# Patient Record
Sex: FEMALE | Race: WHITE | Employment: OTHER | URBAN - METROPOLITAN AREA
[De-identification: names, ages, dates, MRNs, and addresses within clinical notes are randomized per-mention and may not be internally consistent; named-entity substitution may affect disease eponyms.]

---

## 2017-01-02 ENCOUNTER — DOCUMENTATION ONLY (OUTPATIENT)
Dept: CASE MANAGEMENT | Age: 82
End: 2017-01-02

## 2017-01-02 NOTE — PROGRESS NOTES
Emailed information on senior nutrition to patient's daughter  Brief call to daughter to confirm Email address - which is incorrect in the patient's chart. Should be [email or web address]

## 2017-01-05 ENCOUNTER — HOME HEALTH ADMISSION (OUTPATIENT)
Dept: HOME HEALTH SERVICES | Facility: HOME HEALTH | Age: 82
End: 2017-01-05
Payer: MEDICARE

## 2017-01-10 ENCOUNTER — HOME CARE VISIT (OUTPATIENT)
Dept: SCHEDULING | Facility: HOME HEALTH | Age: 82
End: 2017-01-10
Payer: MEDICARE

## 2017-01-10 VITALS — DIASTOLIC BLOOD PRESSURE: 58 MMHG | SYSTOLIC BLOOD PRESSURE: 152 MMHG | RESPIRATION RATE: 16 BRPM | HEART RATE: 66 BPM

## 2017-01-10 PROCEDURE — 400013 HH SOC

## 2017-01-10 PROCEDURE — 3331090001 HH PPS REVENUE CREDIT

## 2017-01-10 PROCEDURE — 3331090002 HH PPS REVENUE DEBIT

## 2017-01-10 PROCEDURE — G0151 HHCP-SERV OF PT,EA 15 MIN: HCPCS

## 2017-01-11 PROCEDURE — 3331090001 HH PPS REVENUE CREDIT

## 2017-01-11 PROCEDURE — 3331090002 HH PPS REVENUE DEBIT

## 2017-01-12 PROCEDURE — 3331090001 HH PPS REVENUE CREDIT

## 2017-01-12 PROCEDURE — 3331090002 HH PPS REVENUE DEBIT

## 2017-01-13 ENCOUNTER — HOME CARE VISIT (OUTPATIENT)
Dept: SCHEDULING | Facility: HOME HEALTH | Age: 82
End: 2017-01-13
Payer: MEDICARE

## 2017-01-13 PROCEDURE — 3331090002 HH PPS REVENUE DEBIT

## 2017-01-13 PROCEDURE — 3331090001 HH PPS REVENUE CREDIT

## 2017-01-13 PROCEDURE — G0151 HHCP-SERV OF PT,EA 15 MIN: HCPCS

## 2017-01-14 PROCEDURE — 3331090001 HH PPS REVENUE CREDIT

## 2017-01-14 PROCEDURE — 3331090002 HH PPS REVENUE DEBIT

## 2017-01-15 VITALS — HEART RATE: 68 BPM | RESPIRATION RATE: 16 BRPM | SYSTOLIC BLOOD PRESSURE: 110 MMHG | DIASTOLIC BLOOD PRESSURE: 62 MMHG

## 2017-01-15 PROCEDURE — 3331090002 HH PPS REVENUE DEBIT

## 2017-01-15 PROCEDURE — 3331090001 HH PPS REVENUE CREDIT

## 2017-01-16 ENCOUNTER — HOME CARE VISIT (OUTPATIENT)
Dept: SCHEDULING | Facility: HOME HEALTH | Age: 82
End: 2017-01-16
Payer: MEDICARE

## 2017-01-16 PROCEDURE — G0151 HHCP-SERV OF PT,EA 15 MIN: HCPCS

## 2017-01-16 PROCEDURE — 3331090001 HH PPS REVENUE CREDIT

## 2017-01-16 PROCEDURE — 3331090002 HH PPS REVENUE DEBIT

## 2017-01-17 PROCEDURE — 3331090002 HH PPS REVENUE DEBIT

## 2017-01-17 PROCEDURE — 3331090001 HH PPS REVENUE CREDIT

## 2017-01-18 VITALS
HEART RATE: 76 BPM | TEMPERATURE: 97.2 F | RESPIRATION RATE: 16 BRPM | DIASTOLIC BLOOD PRESSURE: 72 MMHG | SYSTOLIC BLOOD PRESSURE: 110 MMHG

## 2017-01-18 PROCEDURE — 3331090002 HH PPS REVENUE DEBIT

## 2017-01-18 PROCEDURE — 3331090001 HH PPS REVENUE CREDIT

## 2017-01-19 PROCEDURE — 3331090001 HH PPS REVENUE CREDIT

## 2017-01-19 PROCEDURE — 3331090002 HH PPS REVENUE DEBIT

## 2017-01-20 ENCOUNTER — HOME CARE VISIT (OUTPATIENT)
Dept: HOME HEALTH SERVICES | Facility: HOME HEALTH | Age: 82
End: 2017-01-20
Payer: MEDICARE

## 2017-01-20 PROCEDURE — 3331090002 HH PPS REVENUE DEBIT

## 2017-01-20 PROCEDURE — 3331090001 HH PPS REVENUE CREDIT

## 2017-01-21 PROCEDURE — 3331090001 HH PPS REVENUE CREDIT

## 2017-01-21 PROCEDURE — 3331090002 HH PPS REVENUE DEBIT

## 2017-01-22 PROCEDURE — 3331090001 HH PPS REVENUE CREDIT

## 2017-01-22 PROCEDURE — 3331090002 HH PPS REVENUE DEBIT

## 2017-01-23 PROCEDURE — 3331090002 HH PPS REVENUE DEBIT

## 2017-01-23 PROCEDURE — 3331090001 HH PPS REVENUE CREDIT

## 2017-01-24 ENCOUNTER — HOME CARE VISIT (OUTPATIENT)
Dept: SCHEDULING | Facility: HOME HEALTH | Age: 82
End: 2017-01-24
Payer: MEDICARE

## 2017-01-24 PROCEDURE — 3331090002 HH PPS REVENUE DEBIT

## 2017-01-24 PROCEDURE — G0157 HHC PT ASSISTANT EA 15: HCPCS

## 2017-01-24 PROCEDURE — 3331090001 HH PPS REVENUE CREDIT

## 2017-01-25 VITALS
TEMPERATURE: 97.5 F | HEART RATE: 68 BPM | SYSTOLIC BLOOD PRESSURE: 116 MMHG | RESPIRATION RATE: 16 BRPM | DIASTOLIC BLOOD PRESSURE: 68 MMHG

## 2017-01-25 PROCEDURE — 3331090002 HH PPS REVENUE DEBIT

## 2017-01-25 PROCEDURE — 3331090001 HH PPS REVENUE CREDIT

## 2017-01-26 PROCEDURE — 3331090001 HH PPS REVENUE CREDIT

## 2017-01-26 PROCEDURE — 3331090002 HH PPS REVENUE DEBIT

## 2017-01-27 ENCOUNTER — HOME CARE VISIT (OUTPATIENT)
Dept: SCHEDULING | Facility: HOME HEALTH | Age: 82
End: 2017-01-27
Payer: MEDICARE

## 2017-01-27 PROCEDURE — 3331090002 HH PPS REVENUE DEBIT

## 2017-01-27 PROCEDURE — G0157 HHC PT ASSISTANT EA 15: HCPCS

## 2017-01-27 PROCEDURE — 3331090001 HH PPS REVENUE CREDIT

## 2017-01-28 PROCEDURE — 3331090002 HH PPS REVENUE DEBIT

## 2017-01-28 PROCEDURE — 3331090001 HH PPS REVENUE CREDIT

## 2017-01-29 VITALS
RESPIRATION RATE: 18 BRPM | TEMPERATURE: 97.5 F | SYSTOLIC BLOOD PRESSURE: 116 MMHG | HEART RATE: 88 BPM | DIASTOLIC BLOOD PRESSURE: 70 MMHG

## 2017-01-29 PROCEDURE — 3331090001 HH PPS REVENUE CREDIT

## 2017-01-29 PROCEDURE — 3331090002 HH PPS REVENUE DEBIT

## 2017-01-30 PROCEDURE — 3331090002 HH PPS REVENUE DEBIT

## 2017-01-30 PROCEDURE — 3331090001 HH PPS REVENUE CREDIT

## 2017-01-31 ENCOUNTER — HOME CARE VISIT (OUTPATIENT)
Dept: SCHEDULING | Facility: HOME HEALTH | Age: 82
End: 2017-01-31
Payer: MEDICARE

## 2017-01-31 PROCEDURE — G0157 HHC PT ASSISTANT EA 15: HCPCS

## 2017-01-31 PROCEDURE — 3331090002 HH PPS REVENUE DEBIT

## 2017-01-31 PROCEDURE — 3331090001 HH PPS REVENUE CREDIT

## 2017-02-01 VITALS
HEART RATE: 68 BPM | SYSTOLIC BLOOD PRESSURE: 116 MMHG | DIASTOLIC BLOOD PRESSURE: 72 MMHG | RESPIRATION RATE: 8 BRPM | TEMPERATURE: 97.5 F

## 2017-02-01 PROCEDURE — 3331090001 HH PPS REVENUE CREDIT

## 2017-02-01 PROCEDURE — 3331090002 HH PPS REVENUE DEBIT

## 2017-02-02 PROCEDURE — 3331090001 HH PPS REVENUE CREDIT

## 2017-02-02 PROCEDURE — 3331090002 HH PPS REVENUE DEBIT

## 2017-02-03 ENCOUNTER — HOME CARE VISIT (OUTPATIENT)
Dept: SCHEDULING | Facility: HOME HEALTH | Age: 82
End: 2017-02-03
Payer: MEDICARE

## 2017-02-03 VITALS
TEMPERATURE: 95 F | SYSTOLIC BLOOD PRESSURE: 150 MMHG | DIASTOLIC BLOOD PRESSURE: 68 MMHG | RESPIRATION RATE: 17 BRPM | HEART RATE: 70 BPM

## 2017-02-03 PROCEDURE — 3331090002 HH PPS REVENUE DEBIT

## 2017-02-03 PROCEDURE — 3331090001 HH PPS REVENUE CREDIT

## 2017-02-03 PROCEDURE — 3331090003 HH PPS REVENUE ADJ

## 2017-02-03 PROCEDURE — G0151 HHCP-SERV OF PT,EA 15 MIN: HCPCS

## 2017-02-04 PROCEDURE — 3331090001 HH PPS REVENUE CREDIT

## 2017-02-04 PROCEDURE — 3331090002 HH PPS REVENUE DEBIT

## 2017-02-05 PROCEDURE — 3331090002 HH PPS REVENUE DEBIT

## 2017-02-05 PROCEDURE — 3331090001 HH PPS REVENUE CREDIT

## 2017-02-06 PROCEDURE — 3331090001 HH PPS REVENUE CREDIT

## 2017-02-06 PROCEDURE — 3331090002 HH PPS REVENUE DEBIT

## 2017-02-07 PROCEDURE — 3331090002 HH PPS REVENUE DEBIT

## 2017-02-07 PROCEDURE — 3331090001 HH PPS REVENUE CREDIT

## 2017-02-24 ENCOUNTER — DOCUMENTATION ONLY (OUTPATIENT)
Dept: CASE MANAGEMENT | Age: 82
End: 2017-02-24

## 2017-02-24 NOTE — PROGRESS NOTES
Left message for patient's daughter to call back       [facility] has [address] patient       Need to find out what other needs there might be       Make sure they have our contact information for the future.

## 2017-03-01 ENCOUNTER — PATIENT OUTREACH (OUTPATIENT)
Dept: CASE MANAGEMENT | Age: 82
End: 2017-03-01

## 2017-03-01 NOTE — Clinical Note
Patient has had some considered improvement. If you or her daughter feel the need, we will step back in. Thank you!

## 2017-03-01 NOTE — PROGRESS NOTES
Patient's daughter returned my call. Reports that the [employer] Was very helpful and patient continues to work katlyn the exercises   Also reports that patient is implementing some of the dietary/nutritional suggestions provided at the time of the initial home visit. Plan - asked daughter to keep contact information available and to call should her mother experience some illness, become hospitalized or any concern that she thinks we can be of help. Case resolved at this point.

## 2018-12-24 ENCOUNTER — APPOINTMENT (OUTPATIENT)
Dept: GENERAL RADIOLOGY | Age: 83
DRG: 872 | End: 2018-12-24
Attending: EMERGENCY MEDICINE
Payer: MEDICARE

## 2018-12-24 ENCOUNTER — HOSPITAL ENCOUNTER (INPATIENT)
Age: 83
LOS: 4 days | Discharge: NURSING, REHAB OR LONG-TERM CARE | DRG: 872 | End: 2018-12-28
Attending: EMERGENCY MEDICINE | Admitting: FAMILY MEDICINE
Payer: MEDICARE

## 2018-12-24 ENCOUNTER — APPOINTMENT (OUTPATIENT)
Dept: CT IMAGING | Age: 83
DRG: 872 | End: 2018-12-24
Attending: EMERGENCY MEDICINE
Payer: MEDICARE

## 2018-12-24 DIAGNOSIS — I69.990: ICD-10-CM

## 2018-12-24 DIAGNOSIS — R50.9 FEVER, UNSPECIFIED FEVER CAUSE: ICD-10-CM

## 2018-12-24 DIAGNOSIS — A41.9 SEPSIS, DUE TO UNSPECIFIED ORGANISM: Primary | ICD-10-CM

## 2018-12-24 PROBLEM — R47.81 SLURRED SPEECH: Status: ACTIVE | Noted: 2018-12-24

## 2018-12-24 PROBLEM — I63.9 ACUTE ISCHEMIC STROKE (HCC): Status: ACTIVE | Noted: 2018-12-24

## 2018-12-24 PROBLEM — I10 HTN (HYPERTENSION): Status: ACTIVE | Noted: 2018-12-24

## 2018-12-24 LAB
ALBUMIN SERPL-MCNC: 3.7 G/DL (ref 3.2–4.6)
ALBUMIN/GLOB SERPL: 0.9 {RATIO} (ref 1.2–3.5)
ALP SERPL-CCNC: 64 U/L (ref 50–136)
ALT SERPL-CCNC: 21 U/L (ref 12–65)
ANION GAP SERPL CALC-SCNC: 8 MMOL/L (ref 7–16)
AST SERPL-CCNC: 41 U/L (ref 15–37)
BACTERIA URNS QL MICRO: 0 /HPF
BASOPHILS # BLD: 0.1 K/UL (ref 0–0.2)
BASOPHILS NFR BLD: 0 % (ref 0–2)
BILIRUB SERPL-MCNC: 0.7 MG/DL (ref 0.2–1.1)
BUN SERPL-MCNC: 10 MG/DL (ref 8–23)
CALCIUM SERPL-MCNC: 8.6 MG/DL (ref 8.3–10.4)
CASTS URNS QL MICRO: 0 /LPF
CHLORIDE SERPL-SCNC: 103 MMOL/L (ref 98–107)
CO2 SERPL-SCNC: 25 MMOL/L (ref 21–32)
CREAT SERPL-MCNC: 0.69 MG/DL (ref 0.6–1)
DIFFERENTIAL METHOD BLD: ABNORMAL
EOSINOPHIL # BLD: 0 K/UL (ref 0–0.8)
EOSINOPHIL NFR BLD: 0 % (ref 0.5–7.8)
EPI CELLS #/AREA URNS HPF: 0 /HPF
ERYTHROCYTE [DISTWIDTH] IN BLOOD BY AUTOMATED COUNT: 12.2 % (ref 11.9–14.6)
FLUAV AG NPH QL IA: NEGATIVE
FLUBV AG NPH QL IA: NEGATIVE
GLOBULIN SER CALC-MCNC: 3.9 G/DL (ref 2.3–3.5)
GLUCOSE SERPL-MCNC: 128 MG/DL (ref 65–100)
HCT VFR BLD AUTO: 39 % (ref 35.8–46.3)
HGB BLD-MCNC: 12.4 G/DL (ref 11.7–15.4)
IMM GRANULOCYTES # BLD: 0.1 K/UL (ref 0–0.5)
IMM GRANULOCYTES NFR BLD AUTO: 1 % (ref 0–5)
LACTATE BLD-SCNC: 0.89 MMOL/L (ref 0.5–1.9)
LYMPHOCYTES # BLD: 0.5 K/UL (ref 0.5–4.6)
LYMPHOCYTES NFR BLD: 4 % (ref 13–44)
MAGNESIUM SERPL-MCNC: 1.9 MG/DL (ref 1.8–2.4)
MCH RBC QN AUTO: 30.5 PG (ref 26.1–32.9)
MCHC RBC AUTO-ENTMCNC: 31.8 G/DL (ref 31.4–35)
MCV RBC AUTO: 95.8 FL (ref 79.6–97.8)
MONOCYTES # BLD: 0.8 K/UL (ref 0.1–1.3)
MONOCYTES NFR BLD: 6 % (ref 4–12)
NEUTS SEG # BLD: 11.3 K/UL (ref 1.7–8.2)
NEUTS SEG NFR BLD: 89 % (ref 43–78)
NRBC # BLD: 0 K/UL (ref 0–0.2)
PLATELET # BLD AUTO: 219 K/UL (ref 150–450)
PMV BLD AUTO: 10.4 FL (ref 9.4–12.3)
POTASSIUM SERPL-SCNC: 4.1 MMOL/L (ref 3.5–5.1)
PROT SERPL-MCNC: 7.6 G/DL (ref 6.3–8.2)
RBC # BLD AUTO: 4.07 M/UL (ref 4.05–5.2)
RBC #/AREA URNS HPF: NORMAL /HPF
SODIUM SERPL-SCNC: 136 MMOL/L (ref 136–145)
SPECIMEN SOURCE: NORMAL
WBC # BLD AUTO: 12.7 K/UL (ref 4.3–11.1)
WBC URNS QL MICRO: 0 /HPF

## 2018-12-24 PROCEDURE — 80053 COMPREHEN METABOLIC PANEL: CPT

## 2018-12-24 PROCEDURE — 74011250637 HC RX REV CODE- 250/637: Performed by: EMERGENCY MEDICINE

## 2018-12-24 PROCEDURE — 71045 X-RAY EXAM CHEST 1 VIEW: CPT

## 2018-12-24 PROCEDURE — 87804 INFLUENZA ASSAY W/OPTIC: CPT

## 2018-12-24 PROCEDURE — 87086 URINE CULTURE/COLONY COUNT: CPT

## 2018-12-24 PROCEDURE — 65660000000 HC RM CCU STEPDOWN

## 2018-12-24 PROCEDURE — 74177 CT ABD & PELVIS W/CONTRAST: CPT

## 2018-12-24 PROCEDURE — 85025 COMPLETE CBC W/AUTO DIFF WBC: CPT

## 2018-12-24 PROCEDURE — 81015 MICROSCOPIC EXAM OF URINE: CPT

## 2018-12-24 PROCEDURE — 74011000258 HC RX REV CODE- 258: Performed by: EMERGENCY MEDICINE

## 2018-12-24 PROCEDURE — 70450 CT HEAD/BRAIN W/O DYE: CPT

## 2018-12-24 PROCEDURE — 77030011943

## 2018-12-24 PROCEDURE — 93005 ELECTROCARDIOGRAM TRACING: CPT | Performed by: EMERGENCY MEDICINE

## 2018-12-24 PROCEDURE — 96361 HYDRATE IV INFUSION ADD-ON: CPT | Performed by: EMERGENCY MEDICINE

## 2018-12-24 PROCEDURE — 83605 ASSAY OF LACTIC ACID: CPT

## 2018-12-24 PROCEDURE — 74011250636 HC RX REV CODE- 250/636: Performed by: EMERGENCY MEDICINE

## 2018-12-24 PROCEDURE — 74011636320 HC RX REV CODE- 636/320: Performed by: EMERGENCY MEDICINE

## 2018-12-24 PROCEDURE — 96365 THER/PROPH/DIAG IV INF INIT: CPT | Performed by: EMERGENCY MEDICINE

## 2018-12-24 PROCEDURE — 83735 ASSAY OF MAGNESIUM: CPT

## 2018-12-24 PROCEDURE — 99285 EMERGENCY DEPT VISIT HI MDM: CPT | Performed by: EMERGENCY MEDICINE

## 2018-12-24 PROCEDURE — 87040 BLOOD CULTURE FOR BACTERIA: CPT

## 2018-12-24 PROCEDURE — 51701 INSERT BLADDER CATHETER: CPT | Performed by: EMERGENCY MEDICINE

## 2018-12-24 RX ORDER — ACETAMINOPHEN 500 MG
1000 TABLET ORAL
Status: COMPLETED | OUTPATIENT
Start: 2018-12-24 | End: 2018-12-24

## 2018-12-24 RX ORDER — NALOXONE HYDROCHLORIDE 0.4 MG/ML
0.4 INJECTION, SOLUTION INTRAMUSCULAR; INTRAVENOUS; SUBCUTANEOUS AS NEEDED
Status: DISCONTINUED | OUTPATIENT
Start: 2018-12-24 | End: 2018-12-28 | Stop reason: HOSPADM

## 2018-12-24 RX ORDER — LABETALOL HYDROCHLORIDE 5 MG/ML
5 INJECTION, SOLUTION INTRAVENOUS
Status: DISCONTINUED | OUTPATIENT
Start: 2018-12-24 | End: 2018-12-28 | Stop reason: HOSPADM

## 2018-12-24 RX ORDER — LEVOTHYROXINE SODIUM 50 UG/1
50 TABLET ORAL
Status: DISCONTINUED | OUTPATIENT
Start: 2018-12-25 | End: 2018-12-28 | Stop reason: HOSPADM

## 2018-12-24 RX ORDER — ASCORBIC ACID 500 MG
500 TABLET ORAL 2 TIMES DAILY
Status: DISCONTINUED | OUTPATIENT
Start: 2018-12-25 | End: 2018-12-28 | Stop reason: HOSPADM

## 2018-12-24 RX ORDER — SODIUM CHLORIDE 0.9 % (FLUSH) 0.9 %
10 SYRINGE (ML) INJECTION
Status: COMPLETED | OUTPATIENT
Start: 2018-12-24 | End: 2018-12-24

## 2018-12-24 RX ORDER — SODIUM CHLORIDE 0.9 % (FLUSH) 0.9 %
5-10 SYRINGE (ML) INJECTION AS NEEDED
Status: DISCONTINUED | OUTPATIENT
Start: 2018-12-24 | End: 2018-12-28 | Stop reason: HOSPADM

## 2018-12-24 RX ORDER — ACETAMINOPHEN 325 MG/1
650 TABLET ORAL
Status: DISCONTINUED | OUTPATIENT
Start: 2018-12-24 | End: 2018-12-28 | Stop reason: HOSPADM

## 2018-12-24 RX ORDER — GUAIFENESIN 100 MG/5ML
81 LIQUID (ML) ORAL DAILY
Status: DISCONTINUED | OUTPATIENT
Start: 2018-12-24 | End: 2018-12-28 | Stop reason: HOSPADM

## 2018-12-24 RX ORDER — VANCOMYCIN HYDROCHLORIDE
1250 ONCE
Status: COMPLETED | OUTPATIENT
Start: 2018-12-24 | End: 2018-12-25

## 2018-12-24 RX ORDER — ATORVASTATIN CALCIUM 40 MG/1
40 TABLET, FILM COATED ORAL
Status: DISCONTINUED | OUTPATIENT
Start: 2018-12-24 | End: 2018-12-28 | Stop reason: HOSPADM

## 2018-12-24 RX ORDER — GUAIFENESIN 100 MG/5ML
81 LIQUID (ML) ORAL DAILY
Status: DISCONTINUED | OUTPATIENT
Start: 2018-12-25 | End: 2018-12-24

## 2018-12-24 RX ORDER — SODIUM CHLORIDE 0.9 % (FLUSH) 0.9 %
5-10 SYRINGE (ML) INJECTION EVERY 8 HOURS
Status: DISCONTINUED | OUTPATIENT
Start: 2018-12-24 | End: 2018-12-28 | Stop reason: HOSPADM

## 2018-12-24 RX ORDER — HYDROCODONE BITARTRATE AND ACETAMINOPHEN 5; 325 MG/1; MG/1
1 TABLET ORAL
Status: DISCONTINUED | OUTPATIENT
Start: 2018-12-24 | End: 2018-12-28 | Stop reason: HOSPADM

## 2018-12-24 RX ADMIN — DIATRIZOATE MEGLUMINE AND DIATRIZOATE SODIUM 15 ML: 660; 100 LIQUID ORAL; RECTAL at 23:14

## 2018-12-24 RX ADMIN — SODIUM CHLORIDE 100 ML: 900 INJECTION, SOLUTION INTRAVENOUS at 23:45

## 2018-12-24 RX ADMIN — Medication 10 ML: at 23:45

## 2018-12-24 RX ADMIN — SODIUM CHLORIDE 1000 ML: 900 INJECTION, SOLUTION INTRAVENOUS at 22:00

## 2018-12-24 RX ADMIN — ACETAMINOPHEN 1000 MG: 500 TABLET, FILM COATED ORAL at 22:03

## 2018-12-24 RX ADMIN — CEFTRIAXONE SODIUM 1 G: 1 INJECTION, POWDER, FOR SOLUTION INTRAMUSCULAR; INTRAVENOUS at 22:20

## 2018-12-24 RX ADMIN — IOPAMIDOL 100 ML: 755 INJECTION, SOLUTION INTRAVENOUS at 23:45

## 2018-12-25 ENCOUNTER — APPOINTMENT (OUTPATIENT)
Dept: MRI IMAGING | Age: 83
DRG: 872 | End: 2018-12-25
Attending: FAMILY MEDICINE
Payer: MEDICARE

## 2018-12-25 ENCOUNTER — APPOINTMENT (OUTPATIENT)
Dept: ULTRASOUND IMAGING | Age: 83
DRG: 872 | End: 2018-12-25
Attending: FAMILY MEDICINE
Payer: MEDICARE

## 2018-12-25 LAB
ANION GAP SERPL CALC-SCNC: 7 MMOL/L (ref 7–16)
ATRIAL RATE: 90 BPM
BUN SERPL-MCNC: 11 MG/DL (ref 8–23)
CALCIUM SERPL-MCNC: 7.6 MG/DL (ref 8.3–10.4)
CALCULATED P AXIS, ECG09: 73 DEGREES
CALCULATED R AXIS, ECG10: 55 DEGREES
CALCULATED T AXIS, ECG11: 44 DEGREES
CHLORIDE SERPL-SCNC: 109 MMOL/L (ref 98–107)
CHOLEST SERPL-MCNC: 143 MG/DL
CO2 SERPL-SCNC: 27 MMOL/L (ref 21–32)
CREAT SERPL-MCNC: 0.61 MG/DL (ref 0.6–1)
DIAGNOSIS, 93000: NORMAL
ERYTHROCYTE [DISTWIDTH] IN BLOOD BY AUTOMATED COUNT: 12.3 % (ref 11.9–14.6)
EST. AVERAGE GLUCOSE BLD GHB EST-MCNC: 108 MG/DL
GLUCOSE BLD STRIP.AUTO-MCNC: 128 MG/DL (ref 65–100)
GLUCOSE SERPL-MCNC: 92 MG/DL (ref 65–100)
HBA1C MFR BLD: 5.4 % (ref 4.8–6)
HCT VFR BLD AUTO: 32.7 % (ref 35.8–46.3)
HDLC SERPL-MCNC: 59 MG/DL (ref 40–60)
HDLC SERPL: 2.4 {RATIO}
HGB BLD-MCNC: 10.4 G/DL (ref 11.7–15.4)
LDLC SERPL CALC-MCNC: 73.6 MG/DL
LIPID PROFILE,FLP: NORMAL
MCH RBC QN AUTO: 30.7 PG (ref 26.1–32.9)
MCHC RBC AUTO-ENTMCNC: 31.8 G/DL (ref 31.4–35)
MCV RBC AUTO: 96.5 FL (ref 79.6–97.8)
NRBC # BLD: 0 K/UL (ref 0–0.2)
P-R INTERVAL, ECG05: 196 MS
PLATELET # BLD AUTO: 182 K/UL (ref 150–450)
PMV BLD AUTO: 10.4 FL (ref 9.4–12.3)
POTASSIUM SERPL-SCNC: 3.6 MMOL/L (ref 3.5–5.1)
Q-T INTERVAL, ECG07: 368 MS
QRS DURATION, ECG06: 104 MS
QTC CALCULATION (BEZET), ECG08: 450 MS
RBC # BLD AUTO: 3.39 M/UL (ref 4.05–5.2)
SODIUM SERPL-SCNC: 143 MMOL/L (ref 136–145)
T4 FREE SERPL-MCNC: 1.1 NG/DL (ref 0.78–1.46)
TRIGL SERPL-MCNC: 52 MG/DL (ref 35–150)
TROPONIN I SERPL-MCNC: 0.05 NG/ML (ref 0.02–0.05)
TSH SERPL DL<=0.005 MIU/L-ACNC: 0.61 UIU/ML (ref 0.36–3.74)
VENTRICULAR RATE, ECG03: 90 BPM
VLDLC SERPL CALC-MCNC: 10.4 MG/DL (ref 6–23)
WBC # BLD AUTO: 7.6 K/UL (ref 4.3–11.1)

## 2018-12-25 PROCEDURE — 65660000000 HC RM CCU STEPDOWN

## 2018-12-25 PROCEDURE — 93971 EXTREMITY STUDY: CPT

## 2018-12-25 PROCEDURE — 80061 LIPID PANEL: CPT

## 2018-12-25 PROCEDURE — 80048 BASIC METABOLIC PNL TOTAL CA: CPT

## 2018-12-25 PROCEDURE — 74011250636 HC RX REV CODE- 250/636: Performed by: FAMILY MEDICINE

## 2018-12-25 PROCEDURE — 82962 GLUCOSE BLOOD TEST: CPT

## 2018-12-25 PROCEDURE — 36415 COLL VENOUS BLD VENIPUNCTURE: CPT

## 2018-12-25 PROCEDURE — 74011250637 HC RX REV CODE- 250/637: Performed by: FAMILY MEDICINE

## 2018-12-25 PROCEDURE — 86580 TB INTRADERMAL TEST: CPT | Performed by: FAMILY MEDICINE

## 2018-12-25 PROCEDURE — 84484 ASSAY OF TROPONIN QUANT: CPT

## 2018-12-25 PROCEDURE — 70551 MRI BRAIN STEM W/O DYE: CPT

## 2018-12-25 PROCEDURE — 84443 ASSAY THYROID STIM HORMONE: CPT

## 2018-12-25 PROCEDURE — 74011000302 HC RX REV CODE- 302: Performed by: FAMILY MEDICINE

## 2018-12-25 PROCEDURE — 85027 COMPLETE CBC AUTOMATED: CPT

## 2018-12-25 PROCEDURE — 93880 EXTRACRANIAL BILAT STUDY: CPT

## 2018-12-25 PROCEDURE — 83036 HEMOGLOBIN GLYCOSYLATED A1C: CPT

## 2018-12-25 PROCEDURE — 74011000258 HC RX REV CODE- 258: Performed by: FAMILY MEDICINE

## 2018-12-25 PROCEDURE — 77030020263 HC SOL INJ SOD CL0.9% LFCR 1000ML

## 2018-12-25 PROCEDURE — 84439 ASSAY OF FREE THYROXINE: CPT

## 2018-12-25 RX ORDER — FACIAL-BODY WIPES
10 EACH TOPICAL ONCE
Status: COMPLETED | OUTPATIENT
Start: 2018-12-25 | End: 2018-12-25

## 2018-12-25 RX ORDER — SODIUM CHLORIDE 9 MG/ML
75 INJECTION, SOLUTION INTRAVENOUS CONTINUOUS
Status: DISCONTINUED | OUTPATIENT
Start: 2018-12-25 | End: 2018-12-25

## 2018-12-25 RX ORDER — ENOXAPARIN SODIUM 100 MG/ML
30 INJECTION SUBCUTANEOUS EVERY 24 HOURS
Status: DISCONTINUED | OUTPATIENT
Start: 2018-12-25 | End: 2018-12-28 | Stop reason: HOSPADM

## 2018-12-25 RX ADMIN — LEVOTHYROXINE SODIUM 50 MCG: 50 TABLET ORAL at 05:06

## 2018-12-25 RX ADMIN — VANCOMYCIN HYDROCHLORIDE 1250 MG: 10 INJECTION, POWDER, LYOPHILIZED, FOR SOLUTION INTRAVENOUS at 02:38

## 2018-12-25 RX ADMIN — Medication 10 ML: at 21:42

## 2018-12-25 RX ADMIN — ASPIRIN 81 MG 81 MG: 81 TABLET ORAL at 00:39

## 2018-12-25 RX ADMIN — PIPERACILLIN SODIUM,TAZOBACTAM SODIUM 3.38 G: 3; .375 INJECTION, POWDER, FOR SOLUTION INTRAVENOUS at 00:39

## 2018-12-25 RX ADMIN — Medication 10 ML: at 00:52

## 2018-12-25 RX ADMIN — PIPERACILLIN SODIUM,TAZOBACTAM SODIUM 3.38 G: 3; .375 INJECTION, POWDER, FOR SOLUTION INTRAVENOUS at 08:33

## 2018-12-25 RX ADMIN — PIPERACILLIN SODIUM,TAZOBACTAM SODIUM 3.38 G: 3; .375 INJECTION, POWDER, FOR SOLUTION INTRAVENOUS at 17:18

## 2018-12-25 RX ADMIN — Medication 10 ML: at 05:06

## 2018-12-25 RX ADMIN — Medication 10 ML: at 14:00

## 2018-12-25 RX ADMIN — OXYCODONE HYDROCHLORIDE AND ACETAMINOPHEN 500 MG: 500 TABLET ORAL at 08:33

## 2018-12-25 RX ADMIN — TUBERCULIN PURIFIED PROTEIN DERIVATIVE 5 UNITS: 5 INJECTION, SOLUTION INTRADERMAL at 00:39

## 2018-12-25 RX ADMIN — OXYCODONE HYDROCHLORIDE AND ACETAMINOPHEN 500 MG: 500 TABLET ORAL at 17:18

## 2018-12-25 RX ADMIN — ENOXAPARIN SODIUM 30 MG: 30 INJECTION SUBCUTANEOUS at 21:42

## 2018-12-25 RX ADMIN — BISACODYL 10 MG: 10 SUPPOSITORY RECTAL at 09:46

## 2018-12-25 RX ADMIN — ATORVASTATIN CALCIUM 40 MG: 40 TABLET, FILM COATED ORAL at 21:42

## 2018-12-25 NOTE — ED PROVIDER NOTES
Per nurse's notes: \"Pt arrived from home by self via [facility] with c/o leg weakness. Pt called out to [facility] earlier today for assistance to get out of her chair, refused transport. Called again and EMS asked ehr if she would go to hospital. Pt agreed. Pt states, \"My legs wouldn't work tonight. \" \"      The history is provided by the patient and the EMS personnel. The history is limited by the condition of the patient. Fatigue   This is a new problem. The current episode started 12 to 24 hours ago. The problem has been gradually worsening. There was no focality noted. Pertinent negatives include no focal weakness, no loss of sensation, no loss of balance, no slurred speech, no speech difficulty, no memory loss, no movement disorder, no agitation, no visual change, no auditory change, no mental status change, no unresponsiveness and no disorientation. There has been no fever. Associated symptoms include confusion. Pertinent negatives include no shortness of breath, no chest pain, no vomiting, no altered mental status, no headaches, no choking, no nausea, no bowel incontinence and no bladder incontinence. There were no medications administered prior to arrival. Associated medical issues do not include trauma, mood changes, bleeding disorder, seizures, dementia, CVA or clotting disorder.         Past Medical History:   Diagnosis Date    Acute lymphadenitis     Backache, unspecified     Elevated blood pressure reading without diagnosis of hypertension     Esophageal reflux     Hyperosmolality and/or hypernatremia 2/6/2015    Hypothyroidism 2/6/2015    Impaired fasting glucose 2/6/2015    Mixed hyperlipidemia 2/6/2015    Other constipation     Unspecified venous (peripheral) insufficiency     Viral warts, unspecified 2/6/2015       Past Surgical History:   Procedure Laterality Date    HX HERNIA REPAIR      HX HYSTERECTOMY      TOTAL         Family History:   Problem Relation Age of Onset    Heart Disease Other Arteriosclerotic Cardiovascular Disease    Heart Disease Sister     Diabetes Brother     Heart Disease Brother     Breast Cancer Cousin         2 1st cousins and her jeff's daughter.  Ovarian Cancer Neg Hx     Colon Cancer Neg Hx     Prostate Cancer Neg Hx     Deep Vein Thrombosis Neg Hx     Pulmonary Embolism Neg Hx        Social History     Socioeconomic History    Marital status:      Spouse name: Not on file    Number of children: Not on file    Years of education: Not on file    Highest education level: Not on file   Social Needs    Financial resource strain: Not on file    Food insecurity - worry: Not on file    Food insecurity - inability: Not on file   [email or web address] needs - medical: Not on file   [email or web address] needs - non-medical: Not on file   Occupational History    Not on file   Tobacco Use    Smoking status: Never Smoker    Smokeless tobacco: Never Used   Substance and Sexual Activity    Alcohol use: No     Alcohol/week: 0.0 oz    Drug use: No    Sexual activity: No     Birth control/protection: None   Other Topics Concern    Not on file   Social History Narrative    1. Daughter Emy Mathew. ALLERGIES: Fish oil    Review of Systems   Constitutional: Positive for activity change, appetite change and fatigue. Negative for chills. Respiratory: Negative for choking and shortness of breath. Cardiovascular: Negative for chest pain. Gastrointestinal: Negative for bowel incontinence, nausea and vomiting. Genitourinary: Negative for bladder incontinence. Neurological: Positive for weakness (generalized). Negative for focal weakness, speech difficulty, headaches and loss of balance. Psychiatric/Behavioral: Positive for confusion. Negative for agitation and memory loss. All other systems reviewed and are negative.       Vitals:    12/24/18 2213 12/24/18 2216 12/24/18 2315 12/25/18 0004   BP: 172/76 172/76 122/56 131/69   Pulse: (!) 130 (!) 107 80 77   Resp:       Temp:    98 °F (36.7 °C)   SpO2:  97% 97% 96%   Weight:       Height:                Physical Exam   Constitutional: She is oriented to person, place, and time. She appears well-developed and well-nourished. No distress. Elderly white female, appears fatigued   HENT:   Head: Normocephalic and atraumatic. Right Ear: External ear normal.   Left Ear: External ear normal.   Mouth/Throat: Oropharynx is clear and moist.   Eyes: Conjunctivae and EOM are normal. Pupils are equal, round, and reactive to light. Neck: Normal range of motion. Neck supple. Cardiovascular: Normal rate, regular rhythm, normal heart sounds and intact distal pulses. Pulmonary/Chest: Effort normal and breath sounds normal.   Abdominal: Soft. Bowel sounds are normal. She exhibits no distension and no mass. There is no tenderness. There is no rebound and no guarding. No hernia. Musculoskeletal: Normal range of motion. She exhibits no edema. Neurological: She is alert and oriented to person, place, and time. She displays normal reflexes. No cranial nerve deficit or sensory deficit. She exhibits normal muscle tone. Coordination normal.   Skin: Skin is warm and dry. Capillary refill takes less than 2 seconds. Psychiatric: She has a normal mood and affect. Her speech is delayed. She is slowed. Cognition and memory are normal.   Nursing note and vitals reviewed.        MDM  Number of Diagnoses or Management Options     Amount and/or Complexity of Data Reviewed  Clinical lab tests: ordered and reviewed  Tests in the radiology section of CPT®: ordered and reviewed  Obtain history from someone other than the patient: yes  Review and summarize past medical records: yes  Discuss the patient with other providers: yes  Independent visualization of images, tracings, or specimens: yes    Risk of Complications, Morbidity, and/or Mortality  Presenting problems: high  Diagnostic procedures: high  Management options: high    Patient Progress  Patient progress: stable         Procedures      The patient was observed in the ED. Results Reviewed:      Recent Results (from the past 24 hour(s))   CBC WITH AUTOMATED DIFF    Collection Time: 12/24/18  9:18 PM   Result Value Ref Range    WBC 12.7 (H) 4.3 - 11.1 K/uL    RBC 4.07 4.05 - 5.2 M/uL    HGB 12.4 11.7 - 15.4 g/dL    HCT 39.0 35.8 - 46.3 %    MCV 95.8 79.6 - 97.8 FL    MCH 30.5 26.1 - 32.9 PG    MCHC 31.8 31.4 - 35.0 g/dL    RDW 12.2 11.9 - 14.6 %    PLATELET 619 645 - 609 K/uL    MPV 10.4 9.4 - 12.3 FL    ABSOLUTE NRBC 0.00 0.0 - 0.2 K/uL    DF AUTOMATED      NEUTROPHILS 89 (H) 43 - 78 %    LYMPHOCYTES 4 (L) 13 - 44 %    MONOCYTES 6 4.0 - 12.0 %    EOSINOPHILS 0 (L) 0.5 - 7.8 %    BASOPHILS 0 0.0 - 2.0 %    IMMATURE GRANULOCYTES 1 0.0 - 5.0 %    ABS. NEUTROPHILS 11.3 (H) 1.7 - 8.2 K/UL    ABS. LYMPHOCYTES 0.5 0.5 - 4.6 K/UL    ABS. MONOCYTES 0.8 0.1 - 1.3 K/UL    ABS. EOSINOPHILS 0.0 0.0 - 0.8 K/UL    ABS. BASOPHILS 0.1 0.0 - 0.2 K/UL    ABS. IMM. GRANS. 0.1 0.0 - 0.5 K/UL   METABOLIC PANEL, COMPREHENSIVE    Collection Time: 12/24/18  9:18 PM   Result Value Ref Range    Sodium 136 136 - 145 mmol/L    Potassium 4.1 3.5 - 5.1 mmol/L    Chloride 103 98 - 107 mmol/L    CO2 25 21 - 32 mmol/L    Anion gap 8 7 - 16 mmol/L    Glucose 128 (H) 65 - 100 mg/dL    BUN 10 8 - 23 MG/DL    Creatinine 0.69 0.6 - 1.0 MG/DL    GFR est AA >60 >60 ml/min/1.73m2    GFR est non-AA >60 >60 ml/min/1.73m2    Calcium 8.6 8.3 - 10.4 MG/DL    Bilirubin, total 0.7 0.2 - 1.1 MG/DL    ALT (SGPT) 21 12 - 65 U/L    AST (SGOT) 41 (H) 15 - 37 U/L    Alk.  phosphatase 64 50 - 136 U/L    Protein, total 7.6 6.3 - 8.2 g/dL    Albumin 3.7 3.2 - 4.6 g/dL    Globulin 3.9 (H) 2.3 - 3.5 g/dL    A-G Ratio 0.9 (L) 1.2 - 3.5     POC LACTIC ACID    Collection Time: 12/24/18  9:18 PM   Result Value Ref Range    Lactic Acid (POC) 0.89 0.5 - 1.9 mmol/L   MAGNESIUM    Collection Time: 12/24/18  9:18 PM   Result Value Ref Range    Magnesium 1.9 1.8 - 2.4 mg/dL   EKG, 12 LEAD, INITIAL    Collection Time: 12/24/18  9:38 PM   Result Value Ref Range    Ventricular Rate 90 BPM    Atrial Rate 90 BPM    P-R Interval 196 ms    QRS Duration 104 ms    Q-T Interval 368 ms    QTC Calculation (Bezet) 450 ms    Calculated P Axis 73 degrees    Calculated R Axis 55 degrees    Calculated T Axis 44 degrees    Diagnosis       !! AGE AND GENDER SPECIFIC ECG ANALYSIS !! Sinus rhythm with frequent Premature ventricular complexes  Incomplete right bundle branch block  Borderline ECG  No previous ECGs available     URINE MICROSCOPIC    Collection Time: 12/24/18  9:39 PM   Result Value Ref Range    WBC 0 0 /hpf    RBC 0-3 0 /hpf    Epithelial cells 0 0 /hpf    Bacteria 0 0 /hpf    Casts 0 0 /lpf   INFLUENZA A & B AG (RAPID TEST)    Collection Time: 12/24/18 10:02 PM   Result Value Ref Range    Influenza A Ag NEGATIVE  NEG      Influenza B Ag NEGATIVE  NEG      Source NASOPHARYNGEAL         CT ABD PELV W CONT   Final Result   IMPRESSION:    1. No acute intra-abdominal process. 2. Moderate to marked constipation. 3. Small bilateral kidney cysts. 4. Prior hysterectomy. CT HEAD WO CONT   Final Result   IMPRESSION: No acute intracranial process. XR CHEST PORT   Final Result   IMPRESSION: No acute process.        DUPLEX CAROTID BILATERAL    (Results Pending)   MRI BRAIN WO CONT    (Results Pending)   DUPLEX LOWER EXT VENOUS RIGHT    (Results Pending)

## 2018-12-25 NOTE — PROGRESS NOTES
Pt with impacted stool. Unable to fully expel stool herself, this nurse assisted by manual deimpaction. Pt with large hard stool. Pt now resting well.

## 2018-12-25 NOTE — ED NOTES
TRANSFER - OUT REPORT:    Verbal report given to Yosvany Cruz on Mary Stockton  being transferred to [facility] for routine progression of care       Report consisted of patients Situation, Background, Assessment and   Recommendations(SBAR). Information from the following report(s) SBAR was reviewed with the receiving nurse. Lines:   Peripheral IV Left Antecubital (Active)   Site Assessment Clean, dry, & intact 12/24/2018  9:15 PM   Phlebitis Assessment 0 12/24/2018  9:15 PM   Infiltration Assessment 0 12/24/2018  9:15 PM   Dressing Status Clean, dry, & intact 12/24/2018  9:15 PM   Dressing Type Transparent 12/24/2018  9:15 PM   Hub Color/Line Status Green 12/24/2018  9:15 PM       Peripheral IV 12/24/18 Right Forearm (Active)   Site Assessment Clean, dry, & intact 12/24/2018  9:54 PM   Phlebitis Assessment 0 12/24/2018  9:54 PM   Infiltration Assessment 0 12/24/2018  9:54 PM   Dressing Status Clean, dry, & intact 12/24/2018  9:54 PM   Dressing Type Transparent 12/24/2018  9:54 PM       Peripheral IV 12/24/18 Right Antecubital (Active)   Site Assessment Clean, dry, & intact 12/24/2018 11:11 PM   Phlebitis Assessment 0 12/24/2018 11:11 PM   Infiltration Assessment 0 12/24/2018 11:11 PM   Dressing Status Clean, dry, & intact 12/24/2018 11:11 PM        Opportunity for questions and clarification was provided.       Patient transported with:   [employer]

## 2018-12-25 NOTE — ED NOTES
In and out catheter used to obtain urine specimen. Specimen collected and bladder drained. Rectal temperature taken due to warmth of patient's skin. Patient tolerated procedure well.

## 2018-12-25 NOTE — ED NOTES
Lab notified to add on urine culture and that blood culture set #2 and flu test were sent with temp labels. Pharmacy notified to send rocephin at this time.

## 2018-12-25 NOTE — PROGRESS NOTES
Pharmacokinetic Consult to Pharmacist    Cassy Patino is a 80 y.o. female being treated for sepsis of unknown etiology with Vancomycin. Height: 5' 3\" (160 cm)  Weight: 48.5 kg (107 lb)  Lab Results   Component Value Date/Time    BUN 10 12/24/2018 09:18 PM    Creatinine 0.69 12/24/2018 09:18 PM    WBC 12.7 (H) 12/24/2018 09:18 PM    Lactic Acid (POC) 0.89 12/24/2018 09:18 PM      Estimated Creatinine Clearance: 39.8 mL/min (based on SCr of 0.69 mg/dL). CULTURES:  All Micro Results     Procedure Component Value Units Date/Time    INFLUENZA A & B AG (RAPID TEST) [[id number]] Collected:  12/24/18 2202    Order Status:  Completed Specimen:  Nasopharyngeal from Nasal washing Updated:  12/24/18 2232     Influenza A Ag NEGATIVE         Comment: NEGATIVE FOR THE PRESENCE OF INFLUENZA A ANTIGEN  INFECTION DUE TO INFLUENZA A CANNOT BE RULED OUT. BECAUSE THE ANTIGEN PRESENT IN THE SAMPLE MAY BE BELOW  THE DETECTION LIMIT OF THE TEST. A NEGATIVE TEST IS PRESUMPTIVE AND IT IS RECOMMENDED THAT THESE RESULTS BE CONFIRMED BY VIRAL CULTURE OR AN FDA-CLEARED INFLUENZA A AND B MOLECULAR ASSAY. Influenza B Ag NEGATIVE         Comment: NEGATIVE FOR THE PRESENCE OF INFLUENZA B ANTIGEN  INFECTION DUE TO INFLUENZA B CANNOT BE RULED OUT. BECAUSE THE ANTIGEN PRESENT IN THE SAMPLE MAY BE BELOW  THE DETECTION LIMIT OF THE TEST. A NEGATIVE TEST IS PRESUMPTIVE AND IT IS RECOMMENDED THAT THESE RESULTS BE CONFIRMED BY VIRAL CULTURE OR AN FDA-CLEARED INFLUENZA A AND B MOLECULAR ASSAY. Source NASOPHARYNGEAL       CULTURE, URINE [[id number]] Collected:  12/24/18 2139    Order Status:  Completed Specimen:  Cath Urine Updated:  12/24/18 2223    CULTURE, BLOOD [[id number]] Collected:  12/24/18 2200    Order Status:  Completed Specimen:  Blood Updated:  12/24/18 2221    CULTURE, BLOOD [[id number]] Collected:  12/24/18 2203    Order Status:  Completed Specimen:  Blood Updated:  12/24/18 2213            Day 1 of vancomycin. Goal trough is 15-20. Vancomycin dose initiated at 1250 mg x 1 dose; followed by Vanc 750 mg IV q18h. Will continue to follow patient.       Thank you,  Julio Garland, Pharm D.

## 2018-12-25 NOTE — PROGRESS NOTES
Hospitalist Progress Note     Admit Date:  2018  9:04 PM   Name:  Eleanore Rinne   Age:  80 y.o.  :  1926   MRN:  [id number]   PCP:  Sterling Gifford DO  Treatment Team: Attending Provider: Wilber Martinez DO    Subjective:   Patient is a 80 y.o. female who presented to the ED for a cc leg weakness bilaterally along with slurred speech. Patient has a hx significant for hypothyroidism, HLD, chronic LE weakness of unknown origin for the past two years, and HTN. Patient noticed that she was having a hard time getting up from her chair around 4:30pm today. At that time she also noted to have trouble with speech and some mild confusion. No hx of stroke. Only takes levothyroxine and amlodipine. Vitals - temp 102. Labs - WBC 12.7. UA negative. Chest x ray negative. Flu negative. 18  Daughter and family at bedside  Pt awake,alert,says feel better  Left facial droop      Objective:     Patient Vitals for the past 24 hrs:   Temp Pulse Resp BP SpO2   18 1557 98 °F (36.7 °C) 69 18 138/52 97 %   18 0737 98.5 °F (36.9 °C) 66 18 118/46 95 %   18 0414 98.6 °F (37 °C) 62 12 117/70 90 %   18 0004 98 °F (36.7 °C) 77  131/69 96 %   18 2315  80  122/56 97 %   18 2216  (!) 107  172/76 97 %   18 2213  (!) 130  172/76    18 2210  (!) 111 25 194/83 95 %   18 2208  93  177/75 93 %   18 2142     95 %   18 2131    161/69    18 2130 (!) 102 °F (38.9 °C)       18 99.5 °F (37.5 °C) 96 20 178/77 98 %     Oxygen Therapy  O2 Sat (%): 97 % (18 1557)  Pulse via Oximetry: 80 beats per minute (18 2315)  O2 Device: Room air (18 0346)    Intake/Output Summary (Last 24 hours) at 2018 1816  Last data filed at 2018 1506  Gross per 24 hour   Intake 1360 ml   Output 1800 ml   Net -440 ml         General:    Well nourished.   Alert.    heent- left facial droop, rt eye droop- according to family acute on chronic  CV:   RRR. No murmur, rub, or gallop. Lungs:   Clear to auscultation bilaterally. No wheezing, rhonchi, or rales. Abdomen:   Soft, nontender, nondistended. Cns- no focal neurological deficits- h/o left hip pain- straight leg raising less left when compared to rt. Normal power all extremities. normal reflexes and plantars down going  Extremities: Warm and dry. No cyanosis or edema. Skin:     No rashes or jaundice. Data Review:  I have reviewed all labs, meds, telemetry events, and studies from the last 24 hours. Recent Results (from the past 24 hour(s))   CBC WITH AUTOMATED DIFF    Collection Time: 12/24/18  9:18 PM   Result Value Ref Range    WBC 12.7 (H) 4.3 - 11.1 K/uL    RBC 4.07 4.05 - 5.2 M/uL    HGB 12.4 11.7 - 15.4 g/dL    HCT 39.0 35.8 - 46.3 %    MCV 95.8 79.6 - 97.8 FL    MCH 30.5 26.1 - 32.9 PG    MCHC 31.8 31.4 - 35.0 g/dL    RDW 12.2 11.9 - 14.6 %    PLATELET 328 724 - 468 K/uL    MPV 10.4 9.4 - 12.3 FL    ABSOLUTE NRBC 0.00 0.0 - 0.2 K/uL    DF AUTOMATED      NEUTROPHILS 89 (H) 43 - 78 %    LYMPHOCYTES 4 (L) 13 - 44 %    MONOCYTES 6 4.0 - 12.0 %    EOSINOPHILS 0 (L) 0.5 - 7.8 %    BASOPHILS 0 0.0 - 2.0 %    IMMATURE GRANULOCYTES 1 0.0 - 5.0 %    ABS. NEUTROPHILS 11.3 (H) 1.7 - 8.2 K/UL    ABS. LYMPHOCYTES 0.5 0.5 - 4.6 K/UL    ABS. MONOCYTES 0.8 0.1 - 1.3 K/UL    ABS. EOSINOPHILS 0.0 0.0 - 0.8 K/UL    ABS. BASOPHILS 0.1 0.0 - 0.2 K/UL    ABS. IMM.  GRANS. 0.1 0.0 - 0.5 K/UL   METABOLIC PANEL, COMPREHENSIVE    Collection Time: 12/24/18  9:18 PM   Result Value Ref Range    Sodium 136 136 - 145 mmol/L    Potassium 4.1 3.5 - 5.1 mmol/L    Chloride 103 98 - 107 mmol/L    CO2 25 21 - 32 mmol/L    Anion gap 8 7 - 16 mmol/L    Glucose 128 (H) 65 - 100 mg/dL    BUN 10 8 - 23 MG/DL    Creatinine 0.69 0.6 - 1.0 MG/DL    GFR est AA >60 >60 ml/min/1.73m2    GFR est non-AA >60 >60 ml/min/1.73m2    Calcium 8.6 8.3 - 10.4 MG/DL    Bilirubin, total 0.7 0.2 - 1.1 MG/DL    ALT (SGPT) 21 12 - 65 U/L    AST (SGOT) 41 (H) 15 - 37 U/L    Alk. phosphatase 64 50 - 136 U/L    Protein, total 7.6 6.3 - 8.2 g/dL    Albumin 3.7 3.2 - 4.6 g/dL    Globulin 3.9 (H) 2.3 - 3.5 g/dL    A-G Ratio 0.9 (L) 1.2 - 3.5     POC LACTIC ACID    Collection Time: 12/24/18  9:18 PM   Result Value Ref Range    Lactic Acid (POC) 0.89 0.5 - 1.9 mmol/L   MAGNESIUM    Collection Time: 12/24/18  9:18 PM   Result Value Ref Range    Magnesium 1.9 1.8 - 2.4 mg/dL   EKG, 12 LEAD, INITIAL    Collection Time: 12/24/18  9:38 PM   Result Value Ref Range    Ventricular Rate 90 BPM    Atrial Rate 90 BPM    P-R Interval 196 ms    QRS Duration 104 ms    Q-T Interval 368 ms    QTC Calculation (Bezet) 450 ms    Calculated P Axis 73 degrees    Calculated R Axis 55 degrees    Calculated T Axis 44 degrees    Diagnosis       !! AGE AND GENDER SPECIFIC ECG ANALYSIS !! Sinus rhythm with frequent Premature ventricular complexes  Incomplete right bundle branch block  Borderline ECG  No previous ECGs available  Confirmed by Renaldo Atkinson MD (), CHARO FRANCIS ([id number]) on 12/25/2018 7:19:27 AM     URINE MICROSCOPIC    Collection Time: 12/24/18  9:39 PM   Result Value Ref Range    WBC 0 0 /hpf    RBC 0-3 0 /hpf    Epithelial cells 0 0 /hpf    Bacteria 0 0 /hpf    Casts 0 0 /lpf   CULTURE, URINE    Collection Time: 12/24/18  9:39 PM   Result Value Ref Range    Special Requests: NO SPECIAL REQUESTS      Culture result:        NO GROWTH AFTER SHORT PERIOD OF INCUBATION. FURTHER RESULTS TO FOLLOW AFTER OVERNIGHT INCUBATION.    CULTURE, BLOOD    Collection Time: 12/24/18 10:00 PM   Result Value Ref Range    Special Requests: RIGHT  FOREARM        Culture result: NO GROWTH AFTER 7 HOURS     INFLUENZA A & B AG (RAPID TEST)    Collection Time: 12/24/18 10:02 PM   Result Value Ref Range    Influenza A Ag NEGATIVE  NEG      Influenza B Ag NEGATIVE  NEG      Source NASOPHARYNGEAL     CULTURE, BLOOD    Collection Time: 12/24/18 10:03 PM   Result Value Ref Range Special Requests: NO SPECIAL REQUESTS  LEFT  HAND        Culture result: NO GROWTH AFTER 8 HOURS     CBC W/O DIFF    Collection Time: 12/25/18  5:00 AM   Result Value Ref Range    WBC 7.6 4.3 - 11.1 K/uL    RBC 3.39 (L) 4.05 - 5.2 M/uL    HGB 10.4 (L) 11.7 - 15.4 g/dL    HCT 32.7 (L) 35.8 - 46.3 %    MCV 96.5 79.6 - 97.8 FL    MCH 30.7 26.1 - 32.9 PG    MCHC 31.8 31.4 - 35.0 g/dL    RDW 12.3 11.9 - 14.6 %    PLATELET 214 398 - 625 K/uL    MPV 10.4 9.4 - 12.3 FL    ABSOLUTE NRBC 0.00 0.0 - 0.2 K/uL   LIPID PANEL    Collection Time: 12/25/18  5:00 AM   Result Value Ref Range    LIPID PROFILE          Cholesterol, total 143 <200 MG/DL    Triglyceride 52 35 - 150 MG/DL    HDL Cholesterol 59 40 - 60 MG/DL    LDL, calculated 73.6 <100 MG/DL    VLDL, calculated 10.4 6.0 - 23.0 MG/DL    CHOL/HDL Ratio 2.4     HEMOGLOBIN A1C WITH EAG    Collection Time: 12/25/18  5:00 AM   Result Value Ref Range    Hemoglobin A1c 5.4 4.8 - 6.0 %    Est. average glucose 420 mg/dL   METABOLIC PANEL, BASIC    Collection Time: 12/25/18  5:00 AM   Result Value Ref Range    Sodium 143 136 - 145 mmol/L    Potassium 3.6 3.5 - 5.1 mmol/L    Chloride 109 (H) 98 - 107 mmol/L    CO2 27 21 - 32 mmol/L    Anion gap 7 7 - 16 mmol/L    Glucose 92 65 - 100 mg/dL    BUN 11 8 - 23 MG/DL    Creatinine 0.61 0.6 - 1.0 MG/DL    GFR est AA >60 >60 ml/min/1.73m2    GFR est non-AA >60 >60 ml/min/1.73m2    Calcium 7.6 (L) 8.3 - 10.4 MG/DL   TROPONIN I    Collection Time: 12/25/18  5:00 AM   Result Value Ref Range    Troponin-I, Qt. 0.05 0.02 - 0.05 NG/ML   TSH 3RD GENERATION    Collection Time: 12/25/18  5:00 AM   Result Value Ref Range    TSH 0.611 0.358 - 3.740 uIU/mL   T4, FREE    Collection Time: 12/25/18  5:00 AM   Result Value Ref Range    T4, Free 1.1 0.78 - 1.46 NG/DL   GLUCOSE, POC    Collection Time: 12/25/18  4:09 PM   Result Value Ref Range    Glucose (POC) 128 (H) 65 - 100 mg/dL        All Micro Results     Procedure Component Value Units Date/Time CULTURE, URINE [[id number]] Collected:  12/24/18 2139    Order Status:  Completed Specimen:  Cath Urine Updated:  12/25/18 0947     Special Requests: NO SPECIAL REQUESTS        Culture result:       NO GROWTH AFTER SHORT PERIOD OF INCUBATION. FURTHER RESULTS TO FOLLOW AFTER OVERNIGHT INCUBATION. CULTURE, BLOOD [[id number]] Collected:  12/24/18 2200    Order Status:  Completed Specimen:  Blood Updated:  12/25/18 0623     Special Requests: --        RIGHT  FOREARM       Culture result: NO GROWTH AFTER 7 HOURS       CULTURE, BLOOD [[id number]] Collected:  12/24/18 2203    Order Status:  Completed Specimen:  Blood Updated:  12/25/18 0623     Special Requests: --        NO SPECIAL REQUESTS  LEFT  HAND       Culture result: NO GROWTH AFTER 8 HOURS       INFLUENZA A & B AG (RAPID TEST) [[id number]] Collected:  12/24/18 2202    Order Status:  Completed Specimen:  Nasopharyngeal from Nasal washing Updated:  12/24/18 2232     Influenza A Ag NEGATIVE         Comment: NEGATIVE FOR THE PRESENCE OF INFLUENZA A ANTIGEN  INFECTION DUE TO INFLUENZA A CANNOT BE RULED OUT. BECAUSE THE ANTIGEN PRESENT IN THE SAMPLE MAY BE BELOW  THE DETECTION LIMIT OF THE TEST. A NEGATIVE TEST IS PRESUMPTIVE AND IT IS RECOMMENDED THAT THESE RESULTS BE CONFIRMED BY VIRAL CULTURE OR AN FDA-CLEARED INFLUENZA A AND B MOLECULAR ASSAY. Influenza B Ag NEGATIVE         Comment: NEGATIVE FOR THE PRESENCE OF INFLUENZA B ANTIGEN  INFECTION DUE TO INFLUENZA B CANNOT BE RULED OUT. BECAUSE THE ANTIGEN PRESENT IN THE SAMPLE MAY BE BELOW  THE DETECTION LIMIT OF THE TEST. A NEGATIVE TEST IS PRESUMPTIVE AND IT IS RECOMMENDED THAT THESE RESULTS BE CONFIRMED BY VIRAL CULTURE OR AN FDA-CLEARED INFLUENZA A AND B MOLECULAR ASSAY.           Source NASOPHARYNGEAL             Current Meds:  Current Facility-Administered Medications   Medication Dose Route Frequency    acetaminophen (TYLENOL) tablet 650 mg  650 mg Oral Q4H PRN    HYDROcodone-acetaminophen (NORCO) 5-325 mg per tablet 1 Tab  1 Tab Oral Q4H PRN    naloxone (NARCAN) injection 0.4 mg  0.4 mg IntraVENous PRN    ascorbic acid (vitamin C) (VITAMIN C) tablet 500 mg  500 mg Oral BID    levothyroxine (SYNTHROID) tablet 50 mcg  50 mcg Oral ACB    tuberculin injection 5 Units  5 Units IntraDERMal ONCE    sodium chloride (NS) flush 5-10 mL  5-10 mL IntraVENous Q8H    sodium chloride (NS) flush 5-10 mL  5-10 mL IntraVENous PRN    atorvastatin (LIPITOR) tablet 40 mg  40 mg Oral QHS    labetalol (NORMODYNE;TRANDATE) injection 5 mg  5 mg IntraVENous Q10MIN PRN    sodium chloride (NS) flush 5-10 mL  5-10 mL IntraVENous PRN    piperacillin-tazobactam (ZOSYN) 3.375 g in 0.9% sodium chloride (MBP/ADV) 100 mL  3.375 g IntraVENous Q8H    aspirin chewable tablet 81 mg  81 mg Oral DAILY       Other Studies (last 24 hours):  Mri Brain Wo Cont    Result Date: 12/25/2018  History: Left facial droop, leg weakness, slurred speech. EXAM: MRI brain without contrast TECHNIQUE: Multiplanar multisequence imaging is performed COMPARISON: CT head without contrast dated 12/24/2018 FINDINGS: Diffusion-weighted imaging demonstrates no restricted diffusion to suggest an acute or early subacute infarct. Gradient echo sequence imaging demonstrates no blooming artifact to suggest retained intracranial blood products. There is generalized cerebral atrophy with ex vacuo dilatation of the ventricles. Normal flow voids are present within the major intracranial vessels. There is no mass or mass effect. There is no midline shift. The basilar cisterns are patent. The paranasal sinuses and mastoid air cells are clear. IMPRESSION: 1. No evidence of acute or early subacute infarct. 2. Generalized cerebral atrophy. There is ex vacuo dilation of the ventricles. Ct Head Wo Cont    Result Date: 12/25/2018  EXAM: Noncontrast CT head. DATE: December 24, 2018. INDICATION: Mental status changes. COMPARISON: None.  TECHNIQUE: Noncontrast CT images of the head were obtained. Radiation dose reduction techniques were used for this study. Our CT scanners use one or all of the following:  Automated exposure control, adjustment of the mA or kV according to patient size, iterative reconstruction. FINDINGS: There is mild volume loss with chronic small vessel ischemic changes in the white matter. No acute infarct, hemorrhage or mass is seen. There is no mass effect, midline shift or evidence of hydrocephalus. The skull and skull base are within normal limits. The visualized paranasal sinuses and mastoid air cells are clear. IMPRESSION: No acute intracranial process. Ct Abd Pelv W Cont    Result Date: 12/25/2018  EXAM: CT abdomen and pelvis with IV contrast. DATE: December 24, 2018. INDICATION: Abdominal pain and fever. COMPARISON: None. TECHNIQUE: Axial CT images of the abdomen and pelvis were obtained after oral contrast and the intravenous injection of 100 mm Isovue-370 CT contrast. Radiation dose reduction techniques were used for this study. Our CT scanners use one or all of the following:  Automated exposure control, adjustment of the mA and/or kV according to patient size, iterative reconstruction. FINDINGS: There is moderate to marked constipation. The small bowel loops are within normal limits. No bowel wall thickening or obstruction is identified. The liver, gallbladder, pancreas, spleen, adrenal glands and urinary bladder are unremarkable. There are solitary bilateral kidney cysts, the largest on the left measuring 3.1 cm. No hydronephrosis is identified. The uterus is absent. There is no abdominal aortic aneurysm or dissection. No lymphadenopathy, ascites, free air or acute inflammatory process is identified. The lung bases are clear. IMPRESSION: 1. No acute intra-abdominal process. 2. Moderate to marked constipation. 3. Small bilateral kidney cysts. 4. Prior hysterectomy.      Xr Chest Port    Result Date: 12/24/2018  EXAM: Chest x-ray. DATE: December 24, 2018. INDICATION: Fever and weakness. COMPARISON: None. TECHNIQUE: Single frontal view chest. FINDINGS: The cardiac silhouette is upper normal in size. The lungs are clear. No pneumothorax, pulmonary vascular congestion or pleural effusion is identified. IMPRESSION: No acute process. Duplex Carotid Bilateral    Result Date: 12/25/2018  HISTORY: Stroke with left hemiparesis FINDINGS: Duplex doppler carotid ultrasound exams performed of both the right and left side of the neck. NASCET criteria. Peak systolic velocity right common carotid artery 126 cm/s, right internal carotid of 146 cm/s with a ratio of 1.2 on the right. Right internal carotid artery end-diastolic velocity of 8 cm/s. Peak systolic velocity left common carotid artery 123 cm/s, internal carotid of 126 cm/s with a ratio of 1.0 on the left. Left internal carotid artery end-diastolic velocity of 10 cm/s. Antegrade flow right vertebral artery and antegrade flow left vertebral artery. Grayscale and color-flow imaging reveal bilateral carotid artery atherosclerosis. IMPRESSION: Bilateral carotid artery atherosclerosis. However, no hemodynamically significant internal carotid artery stenosis. Duplex Lower Ext Venous Right    Result Date: 12/25/2018  HISTORY: Chronic right lower extremity swelling and pain. Exam: Right lower extremity ultrasound Technique: Realtime grayscale and color Doppler imaging is performed in the longitudinal and transverse planes. FINDINGS: There is normal flow, augmentation, and compressibility within the deep venous system from the groin to popliteal fossa. Posterior tibial vein and peroneal vein are also normal. No Baker's cyst. IMPRESSIONS: No evidence of deep venous thrombosis within the right lower extremity.        Assessment and Plan:     Hospital Problems as of 12/25/2018 Date Reviewed: 12/12/2018          Codes Class Noted - Resolved POA    * ([facility]) [facility] ICD-10-CM: A41.9  ICD-9-CM: 038.9, 995.91  12/24/2018 - Present Unknown        Slurred speech ICD-10-CM: R47.81  ICD-9-CM: 784.59  12/24/2018 - Present Unknown        Acute ischemic stroke (HCC) ICD-10-CM: I63.9  ICD-9-CM: 434.91  12/24/2018 - Present         HTN (hypertension) ICD-10-CM: I10  ICD-9-CM: 401.9  12/24/2018 - Present Unknown        Mixed hyperlipidemia ICD-10-CM: E78.2  ICD-9-CM: 272.2  2/6/2015 - Present Yes        Hypothyroidism ICD-10-CM: E03.9  ICD-9-CM: 244.9  2/6/2015 - Present Yes              PLAN:    Slurred speech(improving),left facial droop- prob cva- mri pending- neurology consult in am.  ?sepsis- no source- d/c vancomycin- cont zosyn for now  Hypothyroid  htn    DC planning/Dispo:    DVT ppx:  lovenox    Signed:  Manan Driver MD

## 2018-12-25 NOTE — PROGRESS NOTES
Problem: Nutrition Deficit  Goal: *Optimize nutritional status  Nutrition Assessment for:  1. BPA for Admission Nutrition Screen with positive triggers for  poor oral intake, unexpected weight loss, 2-13#.  2. Consult: Cardiac Diet Education    Assessment:   Admission Diagnosis: Sepsis, ? Stroke vs. TIA    Spoke with pt and pt's daughter at bedside. Consumed \"bites\" of breakfast tray: hashbrowns and egg, drank chocolate Ensure. Lunch tray untouched due to having tests done this AM. Pt reports she eats 2 meals at home: grits, an oatmeal and an egg for breakfast with a small cup of coffee; homemade soup made with canned vegetables, canned soup, canned beans, chicken, along with saltine crackers and dessert brought over from friends. Daughter will bring leftovers occasionally. Pt states she drinks a cup of water to take her medication then does not drink any additional water throughout the day. Pt prepares most of her own meals. History: GERD, HLD, high blood pressure    Meds: Vitamin C, Lipitor, Synthroid, Vanc  Labs: Reviewed    Anthropometrics:  Height: 5' 3\" (160 cm), Weight: 48.5 kg (107 lb), Source not stated, Body mass index is 18.95 kg/m². BMI class of underweight for age, BMI 23-30 recommended for geriatric population. Weight History:    Weight Metrics 12/24/2018 12/12/2018 4/27/2018 4/12/2018 12/12/2017 9/6/2017 6/12/2017   Weight 107 lb 107 lb 113 lb 112 lb 109 lb 105 lb 107 lb   BMI 18.95 kg/m2 18.95 kg/m2 20.02 kg/m2 19.84 kg/m2 19.31 kg/m2 18.6 kg/m2 18.95 kg/m2     Pt reports -115lbs most of adult life. Macronutrient needs:  EER: 5798-0557 kcal/day 25-30 kcal/kg Stated body weight  EPR: 48-58 g/day 1-1.2g/kg Stated body weight      Intake/Comparative Standards: Pt consuming bites of Cardiac diet, which is insufficient to meet estimated kcal and protein needs.      Nutrition Diagnosis:  Inadequate oral intake related to food preferences as evidenced by comparative standards, diet recall appears inadequate in calories, protein, and fluid, high in added sodium. Nutrition Intervention:  Meals and Snacks: Continue Ensure Enlive. Advised pt and daughter that this is a good source of protein and calories. Also advised weight should be higher for age and encouraged adding a protein snack during the day (yogurt, cottage cheese, chocolate milk, etc). Encouraged increasing fluid intake due to note of pt having impacted stool upon admission. Noted additional sources of fluid, such as milk, soup, lemonade, etc. Daughter states, \"I can try, but the problem is she is 80 and set in her ways. She doesn't want to do anything different. \" Pt is not appropriate for cardiac diet education for this reason. Defer nutrition education at this time. Discharge Plan: Too soon to determine.      Louise Alexander, [address],   C: [phone]

## 2018-12-25 NOTE — PROGRESS NOTES
TRANSFER - IN REPORT:    Verbal report received from Jarrett Seymour RN on Romain Cottrell  being received from ER for routine progression of care      Report consisted of patients Situation, Background, Assessment and   Recommendations(SBAR). Information from the following report(s) SBAR, ED Summary, [facility] and Recent Results was reviewed with the receiving nurse. Opportunity for questions and clarification was provided. Assessment completed upon patients arrival to unit and care assumed.

## 2018-12-25 NOTE — PROGRESS NOTES
Date 12/24/18 0700 - 12/25/18 0659 12/25/18 0700 - 12/26/18 0659   Shift [id number] 1900-0659 24 Hour Total 9478-6036 9354-8771 24 Hour Total   INTAKE   I.V.  1360 1360        I.V.  310 310        Volume (sodium chloride 0.9 % bolus infusion 1,000 mL)  1000 1000        Volume (cefTRIAXone (ROCEPHIN) 1 g in 0.9% sodium chloride (MBP/ADV) 50 mL MBP)  50 50      Shift Total(mL/kg)  8744(42) 4221(04)      OUTPUT   Urine  900 900        Urine Output (mL) (External Female Catheter 12/25/18)  900 900      Shift Total(mL/kg)  900(18.5) 900(18.5)      NET  460 460      Weight (kg)  48.5 48.5 48.5 48.5 48.5     Hourly rounds done. Pt denies pain, nausea, vomiting. All needs met at this time.

## 2018-12-25 NOTE — ED NOTES
Orthostatic blood pressures completed. Patient requiring 2 nurse assist to stand. Patient fearful with putting feet on ground due to not being able to stand earlier today. Patient resting in stretcher with cardiac monitoring and cycling vitals in place.

## 2018-12-25 NOTE — PROGRESS NOTES
12/25/18 0008   Dual Skin Pressure Injury Assessment   Dual Skin Pressure Injury Assessment WDL   Second Care Provider (Based on [address] [place] [address])  Rocio RN

## 2018-12-26 ENCOUNTER — HOME HEALTH ADMISSION (OUTPATIENT)
Dept: HOME HEALTH SERVICES | Facility: HOME HEALTH | Age: 83
End: 2018-12-26
Payer: MEDICARE

## 2018-12-26 LAB
GLUCOSE BLD STRIP.AUTO-MCNC: 122 MG/DL (ref 65–100)
MM INDURATION POC: 0 MM (ref 0–5)
PPD POC: NEGATIVE NEGATIVE

## 2018-12-26 PROCEDURE — 82962 GLUCOSE BLOOD TEST: CPT

## 2018-12-26 PROCEDURE — 97165 OT EVAL LOW COMPLEX 30 MIN: CPT

## 2018-12-26 PROCEDURE — 97110 THERAPEUTIC EXERCISES: CPT

## 2018-12-26 PROCEDURE — 74011250637 HC RX REV CODE- 250/637: Performed by: FAMILY MEDICINE

## 2018-12-26 PROCEDURE — 97161 PT EVAL LOW COMPLEX 20 MIN: CPT

## 2018-12-26 PROCEDURE — 99222 1ST HOSP IP/OBS MODERATE 55: CPT | Performed by: PSYCHIATRY & NEUROLOGY

## 2018-12-26 PROCEDURE — 74011250636 HC RX REV CODE- 250/636: Performed by: FAMILY MEDICINE

## 2018-12-26 PROCEDURE — 92610 EVALUATE SWALLOWING FUNCTION: CPT

## 2018-12-26 PROCEDURE — 65660000000 HC RM CCU STEPDOWN

## 2018-12-26 PROCEDURE — 74011000258 HC RX REV CODE- 258: Performed by: FAMILY MEDICINE

## 2018-12-26 PROCEDURE — 93306 TTE W/DOPPLER COMPLETE: CPT

## 2018-12-26 PROCEDURE — 97530 THERAPEUTIC ACTIVITIES: CPT

## 2018-12-26 RX ADMIN — ASPIRIN 81 MG 81 MG: 81 TABLET ORAL at 09:55

## 2018-12-26 RX ADMIN — OXYCODONE HYDROCHLORIDE AND ACETAMINOPHEN 500 MG: 500 TABLET ORAL at 17:34

## 2018-12-26 RX ADMIN — Medication 10 ML: at 05:48

## 2018-12-26 RX ADMIN — PIPERACILLIN SODIUM,TAZOBACTAM SODIUM 3.38 G: 3; .375 INJECTION, POWDER, FOR SOLUTION INTRAVENOUS at 09:55

## 2018-12-26 RX ADMIN — Medication 10 ML: at 22:24

## 2018-12-26 RX ADMIN — ENOXAPARIN SODIUM 30 MG: 30 INJECTION SUBCUTANEOUS at 17:35

## 2018-12-26 RX ADMIN — OXYCODONE HYDROCHLORIDE AND ACETAMINOPHEN 500 MG: 500 TABLET ORAL at 09:55

## 2018-12-26 RX ADMIN — ATORVASTATIN CALCIUM 40 MG: 40 TABLET, FILM COATED ORAL at 22:24

## 2018-12-26 RX ADMIN — PIPERACILLIN SODIUM,TAZOBACTAM SODIUM 3.38 G: 3; .375 INJECTION, POWDER, FOR SOLUTION INTRAVENOUS at 00:00

## 2018-12-26 RX ADMIN — LEVOTHYROXINE SODIUM 50 MCG: 50 TABLET ORAL at 05:47

## 2018-12-26 NOTE — PROGRESS NOTES
made initial visit. Pt was alert and verbal appearing comfortable with no pain level expressed or observed. Pt's  was present.  welcomed them to [facility] and shared information regarding spiritual care.  provided spiritual care through presence, pastoral conversation, and assurance of prayer.

## 2018-12-26 NOTE — PROGRESS NOTES
[facility]  Face to Face Encounter    Patients Name: Ayo May    YOB: 1926    Ordering Physician: Dr. Neva Lane    Primary Diagnosis: Sepsis [facility])  Acute ischemic stroke [facility])    Date of Face to Face:   12/26/2018                                  Face to Face Encounter findings are related to primary reason for home care:   yes. 1. I certify that the patient needs intermittent care as follows: skilled nursing care:  skilled observation/assessment, patient education  physical therapy: strengthening, stretching/ROM, transfer training, gait/stair training, balance training and pt/caregiver education  occupational therapy:  ADL safety (ie. cooking, bathing, dressing), ROM and pt/caregiver education    2. I certify that this patient is homebound, that is: 1) patient requires the use of a walker device, special transportation, or assistance of another to leave the home; or 2) patient's condition makes leaving the home medically contraindicated; and 3) patient has a normal inability to leave the home and leaving the home requires considerable and taxing effort. Patient may leave the home for infrequent and short duration for medical reasons, and occasional absences for non-medical reasons. Homebound status is due to the following functional limitations: Patient with strength deficits limiting the performance of all ADL's without caregiver assistance or the use of an assistive device. 3. I certify that this patient is under my care and that I, or a nurse practitioner or  [id number], or clinical nurse specialist, or certified nurse midwife, working with me, had a Face-to-Face Encounter that meets the physician Face-to-Face Encounter requirements.   The following are the clinical findings from the [address] [place] [address] encounter that support the need for skilled services and is a summary of the encounter: see hospital chart      See summary of the patient's illness      Lesli DÍAZ Gautam Abraham, [facility]  12/26/2018      THE FOLLOWING TO BE COMPLETED BY THE COMMUNITY PHYSICIAN:    I concur with the findings described above from the F2F encounter that this patient is homebound and in need of a skilled service.     Certifying Physician: _____________________________________      Printed Certifying Physician Name: _____________________________________    Date: _________________

## 2018-12-26 NOTE — PROGRESS NOTES
CM met with patient to complete intervention. Patient alert and oriented. Patient states she lives alone in a one level home with a ramp to enter into the home. Patient states she independent with her ADL's and do have DME's in the home. States she's not interested in rehab but was agreeable to [facility]) if she really needs it but would prefer no services. Patient interested in returning back home. States her daughter Lela Joya lives near and have family support. CM will continue to follow and make any necessary changes that may occur. Care Management Interventions  PCP Verified by CM: Yes(Laura Chambers)  Mode of Transport at Discharge:  Other (see comment)(Family)  Transition of Care Consult (CM Consult): Discharge Planning, [address]: Yes  Discharge Durable Medical Equipment: No(Patient currently have several DME's in the [facility], rolling walker, rollator, tiolet riser.)  Physical Therapy Consult: Yes  Occupational Therapy Consult: Yes  Speech Therapy Consult: Yes  Current Support Network: Own Home, Lives Alone  Confirm Follow Up Transport: Family  Plan discussed with Pt/Family/Caregiver: Yes  Freedom of Choice Offered: Yes  [address] Provided?: No  Discharge Location  Discharge Placement: Home with home health

## 2018-12-26 NOTE — CONSULTS
Consult    Patient: Narinder Epstein MRN: [id number]  SSN: [id number]    YOB: 1926  Age: 80 y.o. Sex: female      Subjective:      Narinder Epstein is a 80 y.o. female who is being seen for acute onset of deterioration in balance and walking. The patient was noted to have the onset of difficulty getting up from a chair and was also noted to have some degree of slurring in terms of speech and perhaps a facial droop. This is somewhat atypical.  It they've is noted however the patient has long-standing history of gait disturbance going back a number of years and has had repetitive falls with inability to basically sustain independent walking for the last 2 years. Additionally the patient is at a fair amount of bladder difficulty and as result does have a commode chair beside her bed she is unable to make it to the commode otherwise    It was noted at the time that she presented the emergency room that she was afebrile    Other details appended from the chart include:     Patient is a 80 y.o. female who presented to the ED for a cc leg weakness bilaterally along with slurred speech. Patient has a hx significant for hypothyroidism, HLD, chronic LE weakness of unknown origin for the past two years, and HTN. Patient noticed that she was having a hard time getting up from her chair around 4:30pm today. At that time she also noted to have trouble with speech and some mild confusion. No hx of stroke. Only takes levothyroxine and amlodipine.        I reviewed the patient's long-standing gait disorder with her daughter    Anam Rivera been no associated tremor or other features to suggest parkinsonism      Past Medical History:   Diagnosis Date    Acute lymphadenitis     Backache, unspecified     Elevated blood pressure reading without diagnosis of hypertension     Esophageal reflux     Hyperosmolality and/or hypernatremia 2/6/2015    Hypothyroidism 2/6/2015    Impaired fasting glucose 2/6/2015    Mixed hyperlipidemia 2/6/2015    Other constipation     Unspecified venous (peripheral) insufficiency     Viral warts, unspecified 2/6/2015     Past Surgical History:   Procedure Laterality Date    HX HERNIA REPAIR      HX HYSTERECTOMY      TOTAL      Family History   Problem Relation Age of Onset    Heart Disease Other         Arteriosclerotic Cardiovascular Disease    Heart Disease Sister     Diabetes Brother     Heart Disease Brother     Breast Cancer Cousin         2 1st cousins and her jeff's daughter.      Ovarian Cancer Neg Hx     Colon Cancer Neg Hx     Prostate Cancer Neg Hx     Deep Vein Thrombosis Neg Hx     Pulmonary Embolism Neg Hx      Social History     Tobacco Use    Smoking status: Never Smoker    Smokeless tobacco: Never Used   Substance Use Topics    Alcohol use: No     Alcohol/week: 0.0 oz      Current Facility-Administered Medications   Medication Dose Route Frequency Provider Last Rate Last Dose    perflutren lipid microspheres (DEFINITY) in NS bolus IV  1 mL IntraVENous PRN Newell Councilman, DO        enoxaparin (LOVENOX) injection 30 mg  30 mg SubCUTAneous Q24H Lori Hernandez MD   30 mg at 12/25/18 2142    acetaminophen (TYLENOL) tablet 650 mg  650 mg Oral Q4H PRN Newell Councilman, DO        HYDROcodone-acetaminophen (NORCO) 5-325 mg per tablet 1 Tab  1 Tab Oral Q4H PRN Newell Councilman, DO        naloxone [facility]) injection 0.4 mg  0.4 mg IntraVENous PRN Newell Councilman, DO        ascorbic acid (vitamin C) (VITAMIN C) tablet 500 mg  500 mg Oral BID Newell Councilman, DO   500 mg at 12/26/18 0955    levothyroxine (SYNTHROID) tablet 50 mcg  50 mcg Oral ACB Newell Councilman, DO   50 mcg at 12/26/18 0547    sodium chloride (NS) flush 5-10 mL  5-10 mL IntraVENous Q8H Newell Councilman, DO   10 mL at 12/26/18 0548    sodium chloride (NS) flush 5-10 mL  5-10 mL IntraVENous PRN Newell Councilman, DO        atorvastatin (LIPITOR) tablet 40 mg  40 mg Oral QHS Estephania Gauss, DO   40 mg at 12/25/18 2142    labetalol (NORMODYNE;TRANDATE) injection 5 mg  5 mg IntraVENous Q10MIN PRN Estephania Gauss, DO        sodium chloride (NS) flush 5-10 mL  5-10 mL IntraVENous PRN Estephania Gauss, DO        piperacillin-tazobactam (ZOSYN) 3.375 g in 0.9% sodium chloride (MBP/ADV) 100 mL  3.375 g IntraVENous Q8H Penelope Avilez Achilles, DO 25 mL/hr at 12/26/18 0955 3.375 g at 12/26/18 6348    aspirin chewable tablet 81 mg  81 mg Oral DAILY Estephania Gauss, DO   81 mg at 12/26/18 9522        Allergies   Allergen Reactions    Fish Oil Nausea and Vomiting       Review of Systems:  Difficulties with bladder control as noted above considerable difficulty in terms of constipation. From a neurological standpoint no history of seizure type activity there've been no hallucinations delusions confusional episodes denies localized neurologic dysfunction such as diplopia dysarthria or dysphasia dysphagia    Remainder of the 10 point review of systems is unremarkable    Long-standing poor issues in terms of nutrition        Objective:     Vitals:    12/25/18 2304 12/26/18 0423 12/26/18 0724 12/26/18 1055   BP: 122/65 128/70 159/70 151/73   Pulse: 66 69 70 77   Resp: 18 18 18 18   Temp: 99.2 °F (37.3 °C) 98.6 °F (37 °C) 97.7 °F (36.5 °C) 98.2 °F (36.8 °C)   SpO2: 91% 93% 96% 95%   Weight:       Height:            Physical Exam:  80year-old who appears in no acute distress. Speech is difficult in terms of her being edentulous. General examination demonstrates the patient to be poorlynourished. Skin turgor is normal.    Cardiac rate and rhythm is normal.  No murmurs appreciated. Examination of the joints reveals she is very small bone does have a degree of camptocormia consistent with osteopenia no evidence ofactive inflammatory peripheral arthritis in the hands, upper or lower extremitiesKirk.   Clear-cut ongoing degenerative change clear-cut poor muscle bulk and tone throughout    . On formal neurologic examination the patient is alert, cooperative, bright     Interaction is normal.    I did not have the patient undergo a full mental status examination  Head and neck examination demonstrates no carotid bruits. No thyromegaly. No evidence of adenopathy in the neck. Cranial nerves  The optic discs are challenging from the visualization standpoint    3 4 6the extraocular movements are full . There is a mild bilateral ptosis. There is no lid lag and there is no nystagmus. there appears to be a mild extraocular movement disturbance of the supranuclear type  5 7 . The face moves symmetrically and equally. Cheek puff is symmetric. There is normal facial sensation. 8 .  Hearing is intact. Cavanaugh and Kelly do not lateralize. 9.  Palate elevates symmetrically in the midline. 10/11/12 speech is normal.  Tongue is midline in the mouth. Shoulder shrug is symmetric. Motor examination  The musculature appears atrophic throughout. No asymmetry in tone is appreciated. Individual muscle. Muscle strength is tested in both upper and lower extremities and is 5/5 throughout. There is no focal drift appreciated in either the upper or lower extremities. Did not assess the patient's gait formally    Finger to nose and heel knee to shin testing is performed accurately. The deep tendon reflexes. The deep tendon reflexes are assessed at biceps, brachioradialis and triceps in the upper extremities, and at the patella and Achilles in the lower extremities. The reflexes are 2/2 in the upper extremities. The reflexes are trace in the lower extremities    The plantar responses demonstrate a flexor response on the left and on the right side. Assessment:   Likely transient worsening based upon perfusion of long-standing cerebral dysfunction frontal lobe most likely. Agree with considerations to exclude toxic factors.   There is no specific underlying entity here that would be speaking primary underlying neurologic treatment  Hospital Problems  Date Reviewed: 12/12/2018          Codes Class Noted POA    * (Principal) Sepsis ([facility] [address] ICD-10-CM: A41.9  ICD-9-CM: 038.9, 995.91  12/24/2018 Unknown        Slurred speech ICD-10-CM: R47.81  ICD-9-CM: 784.59  12/24/2018 Unknown        HTN (hypertension) ICD-10-CM: I10  ICD-9-CM: 401.9  12/24/2018 Unknown        Mixed hyperlipidemia ICD-10-CM: E78.2  ICD-9-CM: 272.2  2/6/2015 Yes        Hypothyroidism ICD-10-CM: E03.9  ICD-9-CM: 244.9  2/6/2015 Yes              Plan:     Would concur with encouraging the patient in areas of nutrition and hydration and continue working on the importance of conditioning in terms of her lower extremity function in particular    No additional neurologic studies needed    Signed By: Emiliano Kumar MD     December 26, 2018

## 2018-12-26 NOTE — PROGRESS NOTES
Problem: Self Care Deficits Care Plan (Adult)  Goal: *Acute Goals and Plan of Care (Insert Text)  1. Patient will complete total body bathing and dressing with modified independence and adaptive equipment as needed. 2. Patient will complete toileting with modified independence and adaptive equipment as needed. 3. Patient will tolerate 20 minutes of OT treatment with no more than 2 rest breaks to increase activity tolerance for ADLs. 4. Patient will complete functional transfers with modified independence and adaptive equipment as needed. 5. Patient will participate in therapeutic exercise to increase strength in BUEs to 4/5 for increased safety and independence with functional transfers. 6. Patient will complete functional mobility of household distances with stand by assistance and adaptive equipment as needed. Timeframe: 7 visits     OCCUPATIONAL THERAPY: Initial Assessment, Treatment Day: Day of Assessment and 1st and AM 12/26/2018  INPATIENT: Hospital Day: 3  [place] / [place] / Product Type: Medicare /      NAME/AGE/GENDER: Katy Langston is a 80 y.o. female   PRIMARY DIAGNOSIS:  Sepsis ([facility] [address]  Acute ischemic stroke ([facility] [address] Sepsis ([facility] [address] Sepsis ([facility] [address]       ICD-10: Treatment Diagnosis:    · Generalized Muscle Weakness (M62.81)  · Other lack of cordination (R27.8)   Precautions/Allergies:    Fall precautions   Fish oil      ASSESSMENT:     Ms. DAQUAN Ch is a 80 y.o. female admitted with confusion, L facial droop, and trouble getting up, MRI negative for acute infarct. At baseline, pt lives alone with daughter nearby and reports independence with ADLs and modified independence with functional mobility, using rollator indoors and RW outside her home. Pt reports no recent falls. Upon arrival, pt alert and oriented x4, agreeable to OT evaluation and treatment. BUE assessment revealed AROM generally decreased and strength decreased (3+/5) in BUEs.  Pt completed bed mobility with SBA. Treatment initiated to include functional transfers and functional mobility within room. Pt expresses fear of falling. Pt scooted to edge of bed with Arnaldo and practiced sit to stand x2 with Arnaldo on first attempt with cueing for technique and hand placement, and CGA and additional time on second attempt. Pt practiced functional mobility to chair with RW and CGA, requiring additional time. Pt left seated in chair with call bell within reach. Pt's deficits include decreased strength, mobility, balance, and activity tolerance, all of which negatively impact independence with ADLs. Lynn Cid is currently functioning below her independent baseline and would benefit from continued OT to increase safety and independence with ADLs. Will follow. This section established at most recent assessment   PROBLEM LIST (Impairments causing functional limitations):  1. Decreased Strength  2. Decreased ADL/Functional Activities  3. Decreased Transfer Abilities  4. Decreased Ambulation Ability/Technique  5. Decreased Balance  6. Decreased Activity Tolerance  7. Decreased Flexibility/Joint Mobility   INTERVENTIONS PLANNED: (Benefits and precautions of occupational therapy have been discussed with the patient.)  1. Activities of daily living training  2. Adaptive equipment training  3. Balance training  4. Clothing management  5. Donning&doffing training  6. Group therapy  7. Hygiene training  8. Neuromuscular re-eduation  9. Therapeutic activity  10. Therapeutic exercise     TREATMENT PLAN: Frequency/Duration: Follow patient 3x/week to address above goals. Rehabilitation Potential For Stated Goals: Good     RECOMMENDED REHABILITATION/EQUIPMENT: (at time of discharge pending progress): Due to the probability of continued deficits (see above) this patient will likely need continued skilled occupational therapy after discharge.   Equipment:    TBD              OCCUPATIONAL PROFILE AND HISTORY:   History of Present Injury/Illness (Reason for Referral):  See H&P. Past Medical History/Comorbidities:   Ms. Michael Hilliard  has a past medical history of Acute lymphadenitis, Backache, unspecified, Elevated blood pressure reading without diagnosis of hypertension, Esophageal reflux, Hyperosmolality and/or hypernatremia, Hypothyroidism, Impaired fasting glucose, Mixed hyperlipidemia, Other constipation, Unspecified venous (peripheral) insufficiency, and Viral warts, unspecified. Ms. Michael Hilliard  has a past surgical history that includes hx hysterectomy and hx hernia repair. Social History/Living Environment:   Home Environment: Private residence  # Steps to Enter: 0  Wheelchair Ramp: Yes  One/Two Story Residence: One story  Living Alone: Yes  Support Systems: Child(mickey)  Patient Expects to be Discharged to[de-identified] Unknown  Current DME Used/Available at Home: Walker, rollator, Walker, rolling, Commode, bedside  Tub or Shower Type: Tub/Shower combination  Prior Level of Function/Work/Activity:  At baseline, pt lives alone with daughter nearby and reports independence with ADLs and modified independence with functional mobility, using rollator indoors and RW outside her home. Dominant Side:         RIGHT  Personal Factors:          Sex:  female        Age:  80 y.o. Number of Personal Factors/Comorbidities that affect the Plan of Care: Brief history (0):  LOW COMPLEXITY   ASSESSMENT OF OCCUPATIONAL PERFORMANCE[de-identified]   Activities of Daily Living:   Basic ADLs (From Assessment) Complex ADLs (From Assessment)   Feeding: Independent  Oral Facial Hygiene/Grooming: Setup  Bathing: Contact guard assistance  Upper Body Dressing: Modified independent  Lower Body Dressing: Minimum assistance  Toileting: Stand by assistance Instrumental ADL  Meal Preparation: Total assistance  Homemaking: Total assistance   Grooming/Bathing/Dressing Activities of Daily Living     Cognitive Retraining  Safety/Judgement: Awareness of environment; Fall prevention Bed/Mat Mobility  Supine to Sit: Stand-by assistance  Sit to Stand: Contact guard assistance  Bed to Chair: Contact guard assistance  Scooting: Minimum assistance       Most Recent Physical Functioning:   Gross Assessment:  AROM: Generally decreased, functional  Strength: Generally decreased, functional  Coordination: Generally decreased, functional  Sensation: Intact               Posture:     Balance:  Sitting: Intact; Without support  Standing: Impaired  Standing - Static: Constant support; Fair  Standing - Dynamic : Fair Bed Mobility:  Supine to Sit: Stand-by assistance  Scooting: Minimum assistance  Wheelchair Mobility:     Transfers:  Sit to Stand: Contact guard assistance  Stand to Sit: Contact guard assistance  Bed to Chair: Contact guard assistance            Patient Vitals for the past 6 hrs:   BP BP Patient Position SpO2 Pulse   18 0724 159/70 At rest 96 % 70   18 1055 151/73 Sitting 95 % 77       Mental Status  Neurologic State: Alert  Orientation Level: Oriented X4  Cognition: Appropriate decision making, Appropriate for age attention/concentration, Follows commands  Perception: Appears intact  Perseveration: No perseveration noted  Safety/Judgement: Awareness of environment, Fall prevention                          Physical Skills Involved:  1. Range of Motion  2. Balance  3. Strength  4. Activity Tolerance Cognitive Skills Affected (resulting in the inability to perform in a timely and safe manner):  1. None Psychosocial Skills Affected:  1. Habits/Routines  2. Environmental Adaptation  3. Self-Awareness   Number of elements that affect the Plan of Care: 3-5:  MODERATE COMPLEXITY   CLINICAL DECISION MAKIN [facility] [address] [address] AM-PAC 6 Clicks   Daily Activity Inpatient Short Form  How much help from another person does the patient currently need. .. Total A Lot A Little None   1. Putting on and taking off regular lower body clothing? [] 1   [] 2   [x] 3   [] 4   2.   Bathing (including washing, rinsing, drying)? [] 1   [] 2   [x] 3   [] 4   3. Toileting, which includes using toilet, bedpan or urinal?   [] 1   [] 2   [x] 3   [] 4   4. Putting on and taking off regular upper body clothing? [] 1   [] 2   [] 3   [x] 4   5. Taking care of personal grooming such as brushing teeth? [] 1   [] 2   [x] 3   [] 4   6. Eating meals? [] 1   [] 2   [] 3   [x] 4   © 2007, Trustees of [address] [place] [address] [address] [address], under license to [employer]. All rights reserved      Score:  Initial: 20 12/26/2018  Most Recent: X (Date: -- )    Interpretation of Tool:  Represents activities that are increasingly more difficult (i.e. Bed mobility, Transfers, Gait). Score 24 23 22-20 19-15 14-10 9-7 6     Modifier CH CI CJ CK CL CM CN      ? Self Care:     - CURRENT STATUS: CJ - 20%-39% impaired, limited or restricted    - GOAL STATUS: CI - 1%-19% impaired, limited or restricted    - D/C STATUS:  ---------------To be determined---------------  [place] / Product Type: Medicare /      Medical Necessity:     · Patient demonstrates good rehab potential due to higher previous functional level. Reason for Services/Other Comments:  · Patient continues to require skilled intervention due to inability to complete ADLs at prior level of independenc. Use of outcome tool(s) and clinical judgement create a POC that gives a: MODERATE COMPLEXITY         TREATMENT:   (In addition to Assessment/Re-Assessment sessions the following treatments were rendered)     Pre-treatment Symptoms/Complaints:    Pain: Initial:   Pain Intensity 1: 0  Post Session:  same     Therapeutic Activity: (    18 minutes): Therapeutic activities including Bed transfers, Chair transfers and Ambulation on level ground to improve mobility, strength, balance, coordination and activity tolerance. Required minimal   to promote dynamic balance in standing.  Pt scooted to edge of bed with Arnaldo and practiced sit to stand x2 with Arnaldo on first attempt with cueing for technique and hand placement, and CGA and additional time on second attempt. Pt practiced functional mobility to chair with RW and CGA, requiring additional time. Braces/Orthotics/Lines/Etc:   · IV  · Purewick  · O2 Device: Room air  Treatment/Session Assessment:    Response to Treatment:  Tolerated well  · Interdisciplinary Collaboration:   o Occupational Therapist  o Registered Nurse  · After treatment position/precautions:   o Up in chair  o Bed/Chair-wheels locked  o Call light within reach   · Compliance with Program/Exercises: Compliant all of the time. · Recommendations/Intent for next treatment session: \"Next visit will focus on advancements to more challenging activities and reduction in assistance provided\".   Total Treatment Duration:  OT Patient Time In/Time Out  Time In: 0945  Time Out: [address] [facility] OTR/L

## 2018-12-26 NOTE — PROGRESS NOTES
Hourly rounds performed through shift, pt denies needs at this time. Bed in low position and call light/ personal items within reach. Will continue to monitor and report to oncoming nurse.

## 2018-12-26 NOTE — PROGRESS NOTES
Patient had started eating when first arrived at 810, performed other echos then went back and attempted again but OT was performing exam on pt. Will attempt after lunch.

## 2018-12-26 NOTE — PROGRESS NOTES
Hospitalist Progress Note     Admit Date:  2018  9:04 PM   Name:  Day Jackson   Age:  80 y.o.  :  1926   MRN:  [id number]   PCP:  Charlann Meckel, DO  Treatment Team: Attending Provider: Sharon Burrell DO; Consulting Provider: Oralia Walsh MD; Speech Language Pathologist: Shira Grissom Utilization Review: Cele Sparks RN; Care Manager: Shelia Pak LMSW    Subjective:   Patient is a 80 y.o. female who presented to the ED for a cc leg weakness bilaterally along with slurred speech. Patient has a hx significant for hypothyroidism, HLD, chronic LE weakness of unknown origin for the past two years, and HTN. Patient noticed that she was having a hard time getting up from her chair around 4:30pm today. At that time she also noted to have trouble with speech and some mild confusion. No hx of stroke. Only takes levothyroxine and amlodipine. Vitals - temp 102. Labs - WBC 12.7. UA negative. Chest x ray negative. Flu negative. 18  Daughter and family at bedside  Pt awake,alert,says feel better  Left facial droop    18  Says doing fine      Objective:     Patient Vitals for the past 24 hrs:   Temp Pulse Resp BP SpO2   18 1500 97.6 °F (36.4 °C) 65 18 137/61 95 %   18 1055 98.2 °F (36.8 °C) 77 18 151/73 95 %   18 0724 97.7 °F (36.5 °C) 70 18 159/70 96 %   18 0423 98.6 °F (37 °C) 69 18 128/70 93 %   18 2304 99.2 °F (37.3 °C) 66 18 122/65 91 %   18 1946 98.2 °F (36.8 °C) 70 18 145/71 95 %     Oxygen Therapy  O2 Sat (%): 95 % (18 1500)  Pulse via Oximetry: 80 beats per minute (18 2315)  O2 Device: Room air (18 2142)    Intake/Output Summary (Last 24 hours) at 2018 1620  Last data filed at 2018 1500  Gross per 24 hour   Intake 1927 ml   Output 2150 ml   Net -223 ml         General:    Well nourished. Alert.    heent-  rt eye droop- according to family acute on chronic  CV:   RRR.   No murmur, rub, or gallop. Lungs:   Clear to auscultation bilaterally. No wheezing, rhonchi, or rales. Abdomen:   Soft, nontender, nondistended. Cns- no focal neurological deficits- h/o left hip pain- straight leg raising less left when compared to rt. Normal power all extremities. normal reflexes and plantars down going  Extremities: Warm and dry. No cyanosis or edema. Skin:     No rashes or jaundice. Data Review:  I have reviewed all labs, meds, telemetry events, and studies from the last 24 hours. Recent Results (from the past 24 hour(s))   PLEASE READ & DOCUMENT PPD TEST IN 24 HRS    Collection Time: 12/26/18 12:04 AM   Result Value Ref Range    PPD negative Negative    mm Induration 0 mm        All Micro Results     Procedure Component Value Units Date/Time    CULTURE, BLOOD [[id number]] Collected:  12/24/18 2200    Order Status:  Completed Specimen:  Blood Updated:  12/26/18 1224     Special Requests: --        RIGHT  FOREARM       Culture result: NO GROWTH 2 DAYS       CULTURE, BLOOD [[id number]] Collected:  12/24/18 2203    Order Status:  Completed Specimen:  Blood Updated:  12/26/18 1224     Special Requests: --        NO SPECIAL REQUESTS  LEFT  HAND       Culture result: NO GROWTH 2 DAYS       CULTURE, URINE [[id number]] Collected:  12/24/18 2139    Order Status:  Completed Specimen:  Cath Urine Updated:  12/26/18 0751     Special Requests: NO SPECIAL REQUESTS        Culture result: NO GROWTH 1 DAY       INFLUENZA A & B AG (RAPID TEST) [[id number]] Collected:  12/24/18 2202    Order Status:  Completed Specimen:  Nasopharyngeal from Nasal washing Updated:  12/24/18 2232     Influenza A Ag NEGATIVE         Comment: NEGATIVE FOR THE PRESENCE OF INFLUENZA A ANTIGEN  INFECTION DUE TO INFLUENZA A CANNOT BE RULED OUT. BECAUSE THE ANTIGEN PRESENT IN THE SAMPLE MAY BE BELOW  THE DETECTION LIMIT OF THE TEST.   A NEGATIVE TEST IS PRESUMPTIVE AND IT IS RECOMMENDED THAT THESE RESULTS BE CONFIRMED BY VIRAL CULTURE OR AN FDA-CLEARED INFLUENZA A AND B MOLECULAR ASSAY. Influenza B Ag NEGATIVE         Comment: NEGATIVE FOR THE PRESENCE OF INFLUENZA B ANTIGEN  INFECTION DUE TO INFLUENZA B CANNOT BE RULED OUT. BECAUSE THE ANTIGEN PRESENT IN THE SAMPLE MAY BE BELOW  THE DETECTION LIMIT OF THE TEST. A NEGATIVE TEST IS PRESUMPTIVE AND IT IS RECOMMENDED THAT THESE RESULTS BE CONFIRMED BY VIRAL CULTURE OR AN FDA-CLEARED INFLUENZA A AND B MOLECULAR ASSAY. Source NASOPHARYNGEAL             Current Meds:  Current Facility-Administered Medications   Medication Dose Route Frequency    enoxaparin (LOVENOX) injection 30 mg  30 mg SubCUTAneous Q24H    acetaminophen (TYLENOL) tablet 650 mg  650 mg Oral Q4H PRN    HYDROcodone-acetaminophen (NORCO) 5-325 mg per tablet 1 Tab  1 Tab Oral Q4H PRN    naloxone (NARCAN) injection 0.4 mg  0.4 mg IntraVENous PRN    ascorbic acid (vitamin C) (VITAMIN C) tablet 500 mg  500 mg Oral BID    levothyroxine (SYNTHROID) tablet 50 mcg  50 mcg Oral ACB    sodium chloride (NS) flush 5-10 mL  5-10 mL IntraVENous Q8H    sodium chloride (NS) flush 5-10 mL  5-10 mL IntraVENous PRN    atorvastatin (LIPITOR) tablet 40 mg  40 mg Oral QHS    labetalol (NORMODYNE;TRANDATE) injection 5 mg  5 mg IntraVENous Q10MIN PRN    sodium chloride (NS) flush 5-10 mL  5-10 mL IntraVENous PRN    piperacillin-tazobactam (ZOSYN) 3.375 g in 0.9% sodium chloride (MBP/ADV) 100 mL  3.375 g IntraVENous Q8H    aspirin chewable tablet 81 mg  81 mg Oral DAILY       Other Studies (last 24 hours):  No results found.     Assessment and Plan:     Hospital Problems as of 12/26/2018 Date Reviewed: 12/12/2018          Codes Class Noted - Resolved POA    * (Principal) Sepsis ([facility] ICD-10-CM: A41.9  ICD-9-CM: 038.9, 995.91  12/24/2018 - Present Unknown        Slurred speech ICD-10-CM: R47.81  ICD-9-CM: 784.59  12/24/2018 - Present Unknown        Acute ischemic stroke ([facility] ICD-10-CM: I63.9  ICD-9-CM: 434.91  12/24/2018 - Present HTN (hypertension) ICD-10-CM: I10  ICD-9-CM: 401.9  12/24/2018 - Present Unknown        Mixed hyperlipidemia ICD-10-CM: E78.2  ICD-9-CM: 272.2  2/6/2015 - Present Yes        Hypothyroidism ICD-10-CM: E03.9  ICD-9-CM: 244.9  2/6/2015 - Present Yes              PLAN:    Slurred speech(improving),left facial droop- prob cva- mri no stroke- neurology didn't feel like pt had facial droop- 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   Hypothyroid  htn    DC planning/Dispo:  tomorrow  DVT ppx:  garrett    Signed:  Evon Moser MD

## 2018-12-26 NOTE — ED NOTES
Sepsis Response RN has rounded on patient this afternoon. Patient sitting upright in stretcher, daughter at bedside. Patient eating lunch, talkative, pleasant. Sepsis educational hand outs have been provided to patient and daughter. Opportunities for questions and clarification have been provided.

## 2018-12-26 NOTE — PROGRESS NOTES
Problem: Mobility Impaired (Adult and Pediatric)  Goal: *Acute Goals and Plan of Care (Insert Text)  LTG:  (1.)Ms. Brandan Davis will move from supine to sit and sit to supine , scoot up and down and roll side to side in bed with MODIFIED INDEPENDENCE within 7 treatment day(s). (2.)Ms. Brandan Davis will transfer from bed to chair and chair to bed with MODIFIED INDEPENDENCE using the least restrictive device within 7 treatment day(s). (3.)Ms. Deng will ambulate with SUPERVISION for 150 feet with the least restrictive device within 7 treatment day(s). (4.)Ms. Deng will participate in therapeutic activity/exercises x 23 minutes for increased strength within 7 days. ________________________________________________________________________________________________      PHYSICAL THERAPY: Initial Assessment, Treatment Day: Day of Assessment, AM 12/26/2018  INPATIENT: Hospital Day: 3  [place] / Product Type: Medicare /      NAME/AGE/GENDER: Percy Oliveira is a 80 y.o. female   PRIMARY DIAGNOSIS: Sepsis ([facility]  Acute ischemic stroke ([facility] Sepsis ([facility] Sepsis ([facility]       ICD-10: Treatment Diagnosis:    · Generalized Muscle Weakness (M62.81)  · Difficulty in walking, Not elsewhere classified (R26.2)   Precaution/Allergies:  Fish oil      ASSESSMENT:     Ms. Brandan Davis is a 80 y.o. female in the hospital for the above who was sitting in recliner upon arrival.  OT reported that pt lives in a one story house alone that has 0 steps to enter. OT also reported that PTA Ms. Brandan Davis was independent with ADLs and ambulated with RW/rollator depending on distance. Pt admitted to no recent falls in the past year. Ms. Brandan Davis presents to PT with [facility] and decreased strength in B LEs. Pt also presents with intact sensation in B LEs. During evaluation pt performed STS with CGA and fair standing balance.   She ambulated with RW/CGA around room demonstrating VERY short, shuffled steps and forward head posture. Ms. DAQUAN Ch could benefit from skilled PT as she is currently functioning below her baseline. In addition to evaluation pt received treatment consisting of therapeutic exercise. She performed from recliner with visual and verbal cuing. Pt benefited from treatment to address decreased strength. This section established at most recent assessment   PROBLEM LIST (Impairments causing functional limitations):  1. Decreased Strength  2. Decreased Transfer Abilities  3. Decreased Ambulation Ability/Technique  4. Decreased Balance   INTERVENTIONS PLANNED: (Benefits and precautions of physical therapy have been discussed with the patient.)  1. Balance Exercise  2. Bed Mobility  3. Family Education  4. Gait Training  5. Therapeutic Activites  6. Therapeutic Exercise/Strengthening  7. Transfer Training     TREATMENT PLAN: Frequency/Duration: 3 times a week for duration of hospital stay  Rehabilitation Potential For Stated Goals: Good     RECOMMENDED REHABILITATION/EQUIPMENT: (at time of discharge pending progress): Due to the probability of continued deficits (see above) this patient will likely need continued skilled physical therapy after discharge. Equipment:    None at this time              HISTORY:   History of Present Injury/Illness (Reason for Referral):  See H&P  Past Medical History/Comorbidities:   Ms. DAQUAN Ch  has a past medical history of Acute lymphadenitis, Backache, unspecified, Elevated blood pressure reading without diagnosis of hypertension, Esophageal reflux, Hyperosmolality and/or hypernatremia, Hypothyroidism, Impaired fasting glucose, Mixed hyperlipidemia, Other constipation, Unspecified venous (peripheral) insufficiency, and Viral warts, unspecified. Ms. DAQUAN Ch  has a past surgical history that includes hx hysterectomy and hx hernia repair.   Social History/Living Environment:   Home Environment: Private residence  # Steps to Enter: 0  Wheelchair Ramp: Yes  One/Two Story Residence: One story  Living Alone: Yes  Support Systems: Child(mickey)  Patient Expects to be Discharged to[de-identified] Unknown  Current DME Used/Available at Home: Theora Au, rollator, Walker, rolling  Tub or Shower Type: Tub/Shower combination  Prior Level of Function/Work/Activity:  Pt lives alone in one story house with ramp to enter. PTA was independent with ADLs and ambulatory with AD. Number of Personal Factors/Comorbidities that affect the Plan of Care:  Lives alone 0: LOW COMPLEXITY   EXAMINATION:   Most Recent Physical Functioning:   Gross Assessment:  AROM: Within functional limits  Strength: Generally decreased, functional(B hip flexion 4-/5)  Sensation: Intact               Posture:  Posture (WDL): Exceptions to WDL  Posture Assessment: Forward head  Balance:  Sitting: Intact; With support  Standing: Impaired  Standing - Static: Fair  Standing - Dynamic : Fair Bed Mobility:     Wheelchair Mobility:     Transfers:  Sit to Stand: Contact guard assistance  Stand to Sit: Contact guard assistance  Gait:            Body Structures Involved:  1. Metabolic  2. Endocrine  3. Muscles Body Functions Affected:  1. Neuromusculoskeletal  2. Movement Related  3. Metobolic/Endocrine Activities and Participation Affected:  1. General Tasks and Demands  2. Mobility  3. Domestic Life  4. Community, Social and [place]   Number of elements that affect the Plan of Care: 4+: HIGH COMPLEXITY   CLINICAL PRESENTATION:   Presentation: Stable and uncomplicated: LOW COMPLEXITY   CLINICAL DECISION MAKIN [facility] Mobility Inpatient Short Form  How much difficulty does the patient currently have. .. Unable A Lot A Little None   1. Turning over in bed (including adjusting bedclothes, sheets and blankets)? [] 1   [] 2   [] 3   [x] 4   2. Sitting down on and standing up from a chair with arms ( e.g., wheelchair, bedside commode, etc.)   [] 1   [] 2   [x] 3   [] 4   3.   Moving from lying on back to sitting on the side of the bed? [] 1   [] 2   [] 3   [x] 4   How much help from another person does the patient currently need. .. Total A Lot A Little None   4. Moving to and from a bed to a chair (including a wheelchair)? [] 1   [] 2   [x] 3   [] 4   5. Need to walk in hospital room? [] 1   [] 2   [x] 3   [] 4   6. Climbing 3-5 steps with a railing? [] 1   [] 2   [x] 3   [] 4   © 2007, Trustees of [facility] [place] [employer], under license to [email or web address]. All rights reserved      Score:  Initial: 20 Most Recent: X (Date: -- )    Interpretation of Tool:  Represents activities that are increasingly more difficult (i.e. Bed mobility, Transfers, Gait). Score 24 23 22-20 19-15 14-10 9-7 6     Modifier CH CI CJ CK CL CM CN      ? Mobility - Walking and Moving Around:     - CURRENT STATUS: CJ - 20%-39% impaired, limited or restricted    - GOAL STATUS: CI - 1%-19% impaired, limited or restricted    - D/C STATUS:  ---------------To be determined---------------  [place] / [place] / Product Type: Medicare /      Medical Necessity:     · Patient demonstrates good rehab potential due to higher previous functional level. Reason for Services/Other Comments:  · Patient continues to require skilled intervention due to decreased balance and ambulation. Use of outcome tool(s) and clinical judgement create a POC that gives a: Clear prediction of patient's progress: LOW COMPLEXITY            TREATMENT:   (In addition to Assessment/Re-Assessment sessions the following treatments were rendered)   Pre-treatment Symptoms/Complaints:  None  Pain: Initial:   Pain Intensity 1: 0  Post Session:  0     Therapeutic Exercise: (8 Minutes ):  Exercises per grid below to improve strength. Required minimal visual and verbal cues to promote proper body alignment and promote proper body mechanics. Progressed repetitions and complexity of movement as indicated. Date:  12/26/18 Date:   Date:     ACTIVITY/EXERCISE AM PM AM PM AM PM   Seated LAQ 1 x 15 B  1 x 20 B        Seated marching 1 x 15 B  1 x 20 B        Seated AP 1 x 20 B        Seated hip abd/add 1 x 15 B  1 x 20 B                                   B = bilateral; AA = active assistive; A = active; P = passive          Braces/Orthotics/Lines/Etc:   · IV  · O2 Device: Room air  Treatment/Session Assessment:    · Response to Treatment:  Tolerated well but short, shuffled steps. · Interdisciplinary Collaboration:   o Physical Therapist  o Occupational Therapist  o Registered Nurse  o   · After treatment position/precautions:   o Up in chair  o Bed/Chair-wheels locked  o Call light within reach  o RN notified   · Compliance with Program/Exercises: Will assess as treatment progresses  · Recommendations/Intent for next treatment session: \"Next visit will focus on advancements to more challenging activities and reduction in assistance provided\".   Total Treatment Duration:  PT Patient Time In/Time Out  Time In: 1022  Time Out: 2500 S. Jyoti Estrada, PT, DPT

## 2018-12-26 NOTE — CDMP QUERY
Please clarify if this patient is being treated/managed for:    1. Underweight (BMI 19.9 or less, adult) in the setting of sepsis as evidenced by BMI 19 and weight of 107lbs being treated with RD consult and continuation of Ensure Enlive and encouraged to increased fluid intake PO and adding a protein snack. 2. Other Explanation of clinical findings  3. Unable to Determine (no explanation of clinical findings)    The medical record reflects the following:    Risk Factors: Age, Sepsis, Stroke    Clinical Indicators: BMI 19 and weight of 107lbs     Treatment: RD consult and continuation of Ensure Enlive and encouraged to increased fluid intake PO and adding a protein snack    Please clarify and document your clinical opinion in the progress notes and discharge summary including the definitive and/or presumptive diagnosis, (suspected or probable), related to the above clinical findings. Please include clinical findings supporting your diagnosis.     Thanks,  ROBERT RodriguezN, RN, CDS  Compliant Documentation Management Program  [phone]

## 2018-12-26 NOTE — PROGRESS NOTES
STG: Patient will participate in speech/language evaluation   Speech language pathology: bedside swallow note: Initial Assessment    NAME/AGE/GENDER: Sadie Trujillo is a 80 y.o. female  DATE: 12/26/2018  PRIMARY DIAGNOSIS: Sepsis ([facility] [address]  [facility]      ICD-10: Treatment Diagnosis: oropharyngeal dysphagia R13.12  INTERDISCIPLINARY COLLABORATION: Registered Nurse  PRECAUTIONS/ALLERGIES: Fish oil ASSESSMENT: Ms. DAQUAN Ch presents with functional swallow. Patient presented with thin liquid via cup and straw, puree, mixed, and solid consistencies. Mildly prolonged mastication, however effective. Timely swallow initiation, and single swallows upon palpation. Adequate oral clearing with liquid wash. No overt signs or symptoms of airway compromise observed with liquid or solid textures. Recommend continue regular diet/thin liquids. No dysphagia treatment indicated at this time. Patient with slowed, delayed speech. ? impaired comprehension given need for repetition for clarity throughout session. Impaired rate/precision in diadochokinetic task. Reports currently living alone. Will follow up with speech/language evaluation to determine abilities. Patient will benefit from skilled intervention to address the below impairments. ?????? ? ? This section established at most recent assessment??????????  PROBLEM LIST (Impairments causing functional limitations):  1. Oropharyngeal dysphagia (no symptoms identified)  REHABILITATION POTENTIAL FOR STATED GOALS: Good  PLAN OF CARE:   Patient will benefit from skilled intervention to address the following impairments.   RECOMMENDATIONS AND PLANNED INTERVENTIONS (Benefits and precautions of therapy have been discussed with the patient.):  · continue prescribed diet  MEDICATIONS:  · With liquid  · Whole in puree  ASPIRATION PRECAUTIONS:  · Slow rate of intake  · Small bites/sips  · Upright at 90 degrees during meal  COMPENSATORY STRATEGIES/MODIFICATIONS INCLUDING:  · None  OTHER RECOMMENDATIONS (including follow up treatment recommendations):   · speech/language evaluation  RECOMMENDED DIET MODIFICATIONS DISCUSSED WITH:  · Patient  · Nursing  FREQUENCY/DURATION: Speech therapy to follow up for speech/language evaluation RECOMMENDED REHABILITATION/EQUIPMENT: (at time of discharge pending progress): Due to the probability of continued deficits (see above) this patient will not likely need continued skilled speech therapy after discharge. SUBJECTIVE:   Oriented x4. Delayed response time. History of Present Injury/Illness: Ms. Jeffrey Santana  has a past medical history of Acute lymphadenitis, Backache, unspecified, Elevated blood pressure reading without diagnosis of hypertension, Esophageal reflux, Hyperosmolality and/or hypernatremia, Hypothyroidism, Impaired fasting glucose, Mixed hyperlipidemia, Other constipation, Unspecified venous (peripheral) insufficiency, and Viral warts, unspecified. .  She also  has a past surgical history that includes hx hysterectomy and hx hernia repair. Present Symptoms:    Pain Scale 1: Numeric (0 - 10)  Pain Intensity 1: 0  Current Medications:   No current facility-administered medications on file prior to encounter. Current Outpatient Medications on File Prior to Encounter   Medication Sig Dispense Refill    levothyroxine (SYNTHROID) 50 mcg tablet Take 1 Tab by mouth Daily (before breakfast). 90 Tab 4    amLODIPine (NORVASC) 5 mg tablet Take 1 Tab by mouth daily. (Patient taking differently: Take 5 mg by mouth nightly.) 90 Tab 5    ascorbic acid (VITAMIN C) 500 mg tablet Take 500 mg by mouth two (2) times a day.  calcium carbonate-simethicone 1,000-60 mg chew Take 1,200 mg by mouth daily.  multivitamin (ONE A DAY) tablet Take 1 Tab by mouth daily.  CHOLECALCIFEROL, VITAMIN D3, (VITAMIN D3 PO) Take  by mouth.        Current Dietary Status:     Regular/thin  Social History/Home Situation:    Home Environment: Private residence  One/Two Story Residence: One story  Living Alone: Yes  Support Systems: Child(mickey)  Patient Expects to be Discharged to[de-identified] Private residence  Current DME Used/Available at Home: Walker  OBJECTIVE:   Respiratory Status:        CXR Results:No acute process. MRI/CT Results:1. No evidence of acute or early subacute infarct. 2. Generalized cerebral atrophy. There is ex vacuo dilation of the ventricles. Oral Motor Structure/Speech:  Oral-Motor Structure/Motor Speech  Labial: Decreased rate  Dentition: Upper & lower dentures  Oral Hygiene: adequate  Lingual: Decreased rate    Cognitive and Communication Status:     Orientation Level: Oriented X4  Cognition: Follows commands  Perception: Appears intact          BEDSIDE SWALLOW EVALUATION  Oral Assessment:  Oral Assessment  Labial: Decreased rate  Dentition: Upper & lower dentures  Oral Hygiene: adequate  Lingual: Decreased rate  P.O. Trials:  Patient Position: upright in bed    The patient was given  the following:   Consistency Presented: Mixed consistency; Solid; Thin liquid;Puree  How Presented: Self-fed/presented;Spoon;Straw;Successive swallows;Cup/gulp;Cup/sip    ORAL PHASE:  Bolus Acceptance: No impairment  Bolus Formation/Control: No impairment  Propulsion: No impairment     Oral Residue: Less than 10% of bolus(independently cleared)    PHARYNGEAL PHASE:  Initiation of Swallow: No impairment  Laryngeal Elevation: Functional  Aspiration Signs/Symptoms: None  Vocal Quality: No impairment           Pharyngeal Phase Characteristics: No impairment, issues, or problems     OTHER OBSERVATIONS:  Rate/bite size: WNL   Endurance:   WNL   Comments:  Mildly prolonged mastication, yet effective     Tool Used: Dysphagia Outcome and Severity Scale (MARAL)    Score Comments   Normal Diet  [] 7 With no strategies or extra time needed   Functional Swallow  [x] 6 May have mild oral or pharyngeal delay       Mild Dysphagia    [] 5 Which may require one diet consistency restricted (those who demonstrate penetration which is entirely cleared on MBS would be included)   Mild-Moderate Dysphagia  [] 4 With 1-2 diet consistencies restricted       Moderate Dysphagia  [] 3 With 2 or more diet consistencies restricted       Moderately Severe Dysphagia  [] 2 With partial PO strategies (trials with ST only)       Severe Dysphagia  [] 1 With inability to tolerate any PO safely          Score:  Initial: 6 Most Recent: X (Date: -- )   Interpretation of Tool: The Dysphagia Outcome and Severity Scale (MARAL) is a simple, easy-to-use, 7-point scale developed to systematically rate the functional severity of dysphagia based on objective assessment and make recommendations for diet level, independence level, and type of nutrition. Score 7 6 5 4 3 2 1   Modifier CH CI CJ CK CL CM CN   ?  Swallowing:     - CURRENT STATUS: CI - 1%-19% impaired, limited or restricted    - GOAL STATUS:  CI - 1%-19% impaired, limited or restricted    - D/C STATUS:  CI - 1%-19% impaired, limited or restricted  [place] / [place] / Product Type: Medicare /     TREATMENT:    (In addition to Assessment/Re-Assessment sessions the following treatments were rendered)  Assessment/Reassessment only, no treatment provided today  MODALITIES:                                                                    ORAL MOTOR  EXERCISES:                                                                                                                                                                      LARYNGEAL / PHARYNGEAL EXERCISES:                                                                                                                                     __________________________________________________________________________________________________  Safety:   After treatment position/precautions:  · Call light within reach  · Upright in Bed  Treatment Assessment:  Evaluation only.  Progression/Medical Necessity:   · No dysphagia treatment indicated. Will follow up for speech/langauge evaluation  Compliance with Program/Exercises: Will assess as treatment progresses  Reason for Continuation of Services/Other Comments:  · Patient continues to require skilled intervention due to medical complications. Recommendations/Intent for next treatment session: \"Treatment next visit will focus on speech/language evaluation\".     Total Treatment Duration:  Time In: 3987  Time Out: [address], [facility], [facility] NATALI SOARES, CF-SLP

## 2018-12-27 LAB
BACTERIA SPEC CULT: NORMAL
GLUCOSE BLD STRIP.AUTO-MCNC: 101 MG/DL (ref 65–100)
GLUCOSE BLD STRIP.AUTO-MCNC: 123 MG/DL (ref 65–100)
GLUCOSE BLD STRIP.AUTO-MCNC: 138 MG/DL (ref 65–100)
GLUCOSE BLD STRIP.AUTO-MCNC: 160 MG/DL (ref 65–100)
MM INDURATION POC: 0 MM (ref 0–5)
PPD POC: NEGATIVE NEGATIVE
SERVICE CMNT-IMP: NORMAL

## 2018-12-27 PROCEDURE — 74011250637 HC RX REV CODE- 250/637: Performed by: FAMILY MEDICINE

## 2018-12-27 PROCEDURE — 97530 THERAPEUTIC ACTIVITIES: CPT

## 2018-12-27 PROCEDURE — 65660000000 HC RM CCU STEPDOWN

## 2018-12-27 PROCEDURE — 82962 GLUCOSE BLOOD TEST: CPT

## 2018-12-27 PROCEDURE — 74011250636 HC RX REV CODE- 250/636: Performed by: FAMILY MEDICINE

## 2018-12-27 RX ADMIN — ENOXAPARIN SODIUM 30 MG: 30 INJECTION SUBCUTANEOUS at 21:45

## 2018-12-27 RX ADMIN — Medication 10 ML: at 05:26

## 2018-12-27 RX ADMIN — OXYCODONE HYDROCHLORIDE AND ACETAMINOPHEN 500 MG: 500 TABLET ORAL at 17:24

## 2018-12-27 RX ADMIN — OXYCODONE HYDROCHLORIDE AND ACETAMINOPHEN 500 MG: 500 TABLET ORAL at 09:09

## 2018-12-27 RX ADMIN — Medication 10 ML: at 13:33

## 2018-12-27 RX ADMIN — LEVOTHYROXINE SODIUM 50 MCG: 50 TABLET ORAL at 05:26

## 2018-12-27 RX ADMIN — ATORVASTATIN CALCIUM 40 MG: 40 TABLET, FILM COATED ORAL at 21:46

## 2018-12-27 RX ADMIN — ASPIRIN 81 MG 81 MG: 81 TABLET ORAL at 09:09

## 2018-12-27 RX ADMIN — Medication 10 ML: at 21:46

## 2018-12-27 NOTE — PROGRESS NOTES
Interdisciplinary Rounds completed 12/27/18. Nursing, Case Management, Physician and PT present. Plan of care reviewed and updated.     Probable d/c to rehab 12/29/18

## 2018-12-27 NOTE — PROGRESS NOTES
Hospitalist Progress Note     Admit Date:  2018  9:04 PM   Name:  Nestor Chaudhry   Age:  80 y.o.  :  1926   MRN:  [id number]   PCP:  Carole Bower DO  Treatment Team: Attending Provider: Pamela Robles DO; Consulting Provider: Hima Loo MD; Utilization Review: Donald Jung RN; Care Manager: Alita Canavan[address]; Charge Nurse: Elizabeth Hurd Speech Language Pathologist: Heather Downey    Subjective:   Patient is a 80 y.o. female who presented to the ED for a cc leg weakness bilaterally along with slurred speech. Patient has a hx significant for hypothyroidism, HLD, chronic LE weakness of unknown origin for the past two years, and HTN. Patient noticed that she was having a hard time getting up from her chair around 4:30pm today. At that time she also noted to have trouble with speech and some mild confusion. No hx of stroke. Only takes levothyroxine and amlodipine. Vitals - temp 102. Labs - WBC 12.7. UA negative. Chest x ray negative. Flu negative. 18  Daughter and family at bedside  Pt awake,alert,says feel better  Left facial droop    18  Says doing fine    18  Daughter at bedside  No complaints      Objective:     Patient Vitals for the past 24 hrs:   Temp Pulse Resp BP SpO2   18 1148 98 °F (36.7 °C) 72 18 140/50 97 %   18 0748 98.3 °F (36.8 °C) 78 18 130/66 96 %   18 0430 98.2 °F (36.8 °C) 79 18 136/62 95 %   18 2233 97.9 °F (36.6 °C) 75 18 132/65 96 %   18 1851 98 °F (36.7 °C) 72 18 129/67 95 %   18 1500 97.6 °F (36.4 °C) 65 18 137/61 95 %     Oxygen Therapy  O2 Sat (%): 97 % (18 1148)  Pulse via Oximetry: 80 beats per minute (18 2315)  O2 Device: Room air (12/24/18 2142)    Intake/Output Summary (Last 24 hours) at 2018 1427  Last data filed at 2018 0452  Gross per 24 hour   Intake 250 ml   Output 1600 ml   Net -1350 ml         General:    Well nourished.   Alert.    heent-  rt eye droop- according to family acute on chronic  CV:   RRR. No murmur, rub, or gallop. Lungs:   Clear to auscultation bilaterally. No wheezing, rhonchi, or rales. Abdomen:   Soft, nontender, nondistended. Cns- no focal neurological deficits- h/o left hip pain- straight leg raising less left when compared to rt. Normal power all extremities. normal reflexes and plantars down going  Extremities: Warm and dry. No cyanosis or edema. Skin:     No rashes or jaundice. Data Review:  I have reviewed all labs, meds, telemetry events, and studies from the last 24 hours.     Recent Results (from the past 24 hour(s))   GLUCOSE, POC    Collection Time: 12/26/18  8:38 PM   Result Value Ref Range    Glucose (POC) 122 (H) 65 - 100 mg/dL   PLEASE READ & DOCUMENT PPD TEST IN 48 HRS    Collection Time: 12/27/18  1:35 AM   Result Value Ref Range    PPD negative Negative    mm Induration 0 mm   GLUCOSE, POC    Collection Time: 12/27/18  8:13 AM   Result Value Ref Range    Glucose (POC) 160 (H) 65 - 100 mg/dL   GLUCOSE, POC    Collection Time: 12/27/18 11:47 AM   Result Value Ref Range    Glucose (POC) 101 (H) 65 - 100 mg/dL        All Micro Results     Procedure Component Value Units Date/Time    CULTURE, BLOOD [[id number]] Collected:  12/24/18 2200    Order Status:  Completed Specimen:  Blood Updated:  12/27/18 0909     Special Requests: --        RIGHT  FOREARM       Culture result: NO GROWTH 3 DAYS       CULTURE, BLOOD [[id number]] Collected:  12/24/18 2203    Order Status:  Completed Specimen:  Blood Updated:  12/27/18 0909     Special Requests: --        NO SPECIAL REQUESTS  LEFT  HAND       Culture result: NO GROWTH 3 DAYS       CULTURE, URINE [[id number]] Collected:  12/24/18 2139    Order Status:  Completed Specimen:  Cath Urine Updated:  12/27/18 0645     Special Requests: NO SPECIAL REQUESTS        Culture result: NO GROWTH 2 DAYS       INFLUENZA A & B AG (RAPID TEST) [[id number]] Collected:  12/24/18 2202    Order Status: Completed Specimen:  Nasopharyngeal from Nasal washing Updated:  12/24/18 2232     Influenza A Ag NEGATIVE         Comment: NEGATIVE FOR THE PRESENCE OF INFLUENZA A ANTIGEN  INFECTION DUE TO INFLUENZA A CANNOT BE RULED OUT. BECAUSE THE ANTIGEN PRESENT IN THE SAMPLE MAY BE BELOW  THE DETECTION LIMIT OF THE TEST. A NEGATIVE TEST IS PRESUMPTIVE AND IT IS RECOMMENDED THAT THESE RESULTS BE CONFIRMED BY VIRAL CULTURE OR AN FDA-CLEARED INFLUENZA A AND B MOLECULAR ASSAY. Influenza B Ag NEGATIVE         Comment: NEGATIVE FOR THE PRESENCE OF INFLUENZA B ANTIGEN  INFECTION DUE TO INFLUENZA B CANNOT BE RULED OUT. BECAUSE THE ANTIGEN PRESENT IN THE SAMPLE MAY BE BELOW  THE DETECTION LIMIT OF THE TEST. A NEGATIVE TEST IS PRESUMPTIVE AND IT IS RECOMMENDED THAT THESE RESULTS BE CONFIRMED BY VIRAL CULTURE OR AN FDA-CLEARED INFLUENZA A AND B MOLECULAR ASSAY. Source NASOPHARYNGEAL             Current Meds:  Current Facility-Administered Medications   Medication Dose Route Frequency    enoxaparin (LOVENOX) injection 30 mg  30 mg SubCUTAneous Q24H    acetaminophen (TYLENOL) tablet 650 mg  650 mg Oral Q4H PRN    HYDROcodone-acetaminophen (NORCO) 5-325 mg per tablet 1 Tab  1 Tab Oral Q4H PRN    naloxone (NARCAN) injection 0.4 mg  0.4 mg IntraVENous PRN    ascorbic acid (vitamin C) (VITAMIN C) tablet 500 mg  500 mg Oral BID    levothyroxine (SYNTHROID) tablet 50 mcg  50 mcg Oral ACB    sodium chloride (NS) flush 5-10 mL  5-10 mL IntraVENous Q8H    sodium chloride (NS) flush 5-10 mL  5-10 mL IntraVENous PRN    atorvastatin (LIPITOR) tablet 40 mg  40 mg Oral QHS    labetalol (NORMODYNE;TRANDATE) injection 5 mg  5 mg IntraVENous Q10MIN PRN    sodium chloride (NS) flush 5-10 mL  5-10 mL IntraVENous PRN    aspirin chewable tablet 81 mg  81 mg Oral DAILY       Other Studies (last 24 hours):  No results found.     Assessment and Plan:     Hospital Problems as of 12/27/2018 Date Reviewed: 12/12/2018          Codes Class Noted - Resolved POA    * (Principal) Sepsis ([facility] [address] ICD-10-CM: A41.9  ICD-9-CM: 038.9, 995.91  12/24/2018 - Present Unknown        Slurred speech ICD-10-CM: R47.81  ICD-9-CM: 784.59  12/24/2018 - Present Unknown        Acute ischemic stroke (HCC) ICD-10-CM: I63.9  ICD-9-CM: 434.91  12/24/2018 - Present         HTN (hypertension) ICD-10-CM: I10  ICD-9-CM: 401.9  12/24/2018 - Present Unknown        Mixed hyperlipidemia ICD-10-CM: E78.2  ICD-9-CM: 272.2  2/6/2015 - Present Yes        Hypothyroidism ICD-10-CM: E03.9  ICD-9-CM: 244.9  2/6/2015 - Present Yes              PLAN:    Slurred speech(improving),left facial droop- prob cva- mri no stroke- neurology didn't feel like pt had facial droop- felt her symptoms were from frontal lobe atrophy  ?sepsis- no source- d/c vancomycin- will d/c zosyn  Debility- working with PT- recommend rehab after therapy today-  working on placement.   Hypothyroid  htn    DC planning/Dispo:  tomorrow  DVT ppx:  lovenox    Signed:  Jerson Reina MD

## 2018-12-27 NOTE — PROGRESS NOTES
Problem: Mobility Impaired (Adult and Pediatric)  Goal: *Acute Goals and Plan of Care (Insert Text)  LTG:  (1.)Ms. Alvin Davis will move from supine to sit and sit to supine , scoot up and down and roll side to side in bed with MODIFIED INDEPENDENCE within 7 treatment day(s). (2.)Ms. Alvin Davis will transfer from bed to chair and chair to bed with MODIFIED INDEPENDENCE using the least restrictive device within 7 treatment day(s). (3.)Ms. Deng will ambulate with SUPERVISION for 150 feet with the least restrictive device within 7 treatment day(s). (4.)Ms. Deng will participate in therapeutic activity/exercises x 23 minutes for increased strength within 7 days. ________________________________________________________________________________________________      PHYSICAL THERAPY: Daily Note, Treatment Day: 1st, AM 12/27/2018  INPATIENT: Hospital Day: 4  [place] / [place] / Product Type: Medicare /      NAME/AGE/GENDER: Caty Betancourt is a 80 y.o. female   PRIMARY DIAGNOSIS: Sepsis ([facility] [address]  Acute ischemic stroke ([facility] [address] Sepsis ([facility] [address] Sepsis ([facility] [address]       ICD-10: Treatment Diagnosis:    · Generalized Muscle Weakness (M62.81)  · Difficulty in walking, Not elsewhere classified (R26.2)   Precaution/Allergies:  Fish oil      ASSESSMENT:     Ms. Alvin Davis is a 80 y.o. female in the hospital for the above diagnosis. Per eval  reported that pt lives in a one story house alone that has 0 steps to enter also reported that PTA Ms. Alvin Davis was independent with ADLs and ambulated with RW/rollator depending on distance. Pt admitted to no recent falls in the past year. Ms. Alvin Davis presents supine in bed and agreeable to therapy. Daughter present. Bed mobility requires min assist.  Sitting balance edge of bed unsupported. Sit to stand with min assist.  Gait training with rolling walker x 100 feet with slow colette. Patient has decreased upright posture and short shuffled steps. Patient is returned to the recliner at the end of the treatment session with needs within reach. Good session. Making progress towards goals. Ms. Michael Hilliard could benefit from skilled PT as she is currently functioning below her baseline. Patient would benefit from additional skilled inpatient therapy at discharge. Talked with CM about rehab. Will continue PT efforts. This section established at most recent assessment   PROBLEM LIST (Impairments causing functional limitations):  1. Decreased Strength  2. Decreased Transfer Abilities  3. Decreased Ambulation Ability/Technique  4. Decreased Balance   INTERVENTIONS PLANNED: (Benefits and precautions of physical therapy have been discussed with the patient.)  1. Balance Exercise  2. Bed Mobility  3. Family Education  4. Gait Training  5. Therapeutic Activites  6. Therapeutic Exercise/Strengthening  7. Transfer Training     TREATMENT PLAN: Frequency/Duration: 3 times a week for duration of hospital stay  Rehabilitation Potential For Stated Goals: Good     RECOMMENDED REHABILITATION/EQUIPMENT: (at time of discharge pending progress): Due to the probability of continued deficits (see above) this patient will likely need continued skilled physical therapy after discharge. Equipment:    None at this time              HISTORY:   History of Present Injury/Illness (Reason for Referral):  See H&P  Past Medical History/Comorbidities:   Ms. Michael Hilliard  has a past medical history of Acute lymphadenitis, Backache, unspecified, Elevated blood pressure reading without diagnosis of hypertension, Esophageal reflux, Hyperosmolality and/or hypernatremia, Hypothyroidism, Impaired fasting glucose, Mixed hyperlipidemia, Other constipation, Unspecified venous (peripheral) insufficiency, and Viral warts, unspecified. Ms. Michael Hilliard  has a past surgical history that includes hx hysterectomy and hx hernia repair.   Social History/Living Environment:   Home Environment: Private residence  # Steps to Enter: 0  Wheelchair Ramp: Yes  One/Two Story Residence: One story  Living Alone: Yes  Support Systems: Child(mickey)  Patient Expects to be Discharged to[de-identified] Unknown  Current DME Used/Available at Home: Walker, rollator, Walker, rolling  Tub or Shower Type: Tub/Shower combination  Prior Level of Function/Work/Activity:  Pt lives alone in one story house with ramp to enter. PTA was independent with ADLs and ambulatory with AD. Number of Personal Factors/Comorbidities that affect the Plan of Care:  Lives alone 0: LOW COMPLEXITY   EXAMINATION:   Most Recent Physical Functioning:   Gross Assessment:                  Posture:     Balance:  Sitting: Intact  Standing: Impaired  Standing - Static: Constant support; Fair  Standing - Dynamic : Fair Bed Mobility:  Rolling: Minimum assistance  Supine to Sit: Minimum assistance  Scooting: Minimum assistance  Wheelchair Mobility:     Transfers:  Sit to Stand: Minimum assistance  Stand to Sit: Minimum assistance  Gait:            Body Structures Involved:  1. Metabolic  2. Endocrine  3. Muscles Body Functions Affected:  1. Neuromusculoskeletal  2. Movement Related  3. Metobolic/Endocrine Activities and Participation Affected:  1. General Tasks and Demands  2. Mobility  3. Domestic Life  4. Community, Social and [place]   Number of elements that affect the Plan of Care: 4+: HIGH COMPLEXITY   CLINICAL PRESENTATION:   Presentation: Stable and uncomplicated: LOW COMPLEXITY   CLINICAL DECISION MAKIN [facility] Inpatient Short Form  How much difficulty does the patient currently have. .. Unable A Lot A Little None   1. Turning over in bed (including adjusting bedclothes, sheets and blankets)? [] 1   [] 2   [] 3   [x] 4   2. Sitting down on and standing up from a chair with arms ( e.g., wheelchair, bedside commode, etc.)   [] 1   [] 2   [x] 3   [] 4   3.   Moving from lying on back to sitting on the side of the bed?   [] 1   [] 2   [] 3   [x] 4   How much help from another person does the patient currently need. .. Total A Lot A Little None   4. Moving to and from a bed to a chair (including a wheelchair)? [] 1   [] 2   [x] 3   [] 4   5. Need to walk in hospital room? [] 1   [] 2   [x] 3   [] 4   6. Climbing 3-5 steps with a railing? [] 1   [] 2   [x] 3   [] 4   © 2007, Trustees of [address] [place] [address] [address] [address], under license to [employer]. All rights reserved      Score:  Initial: 20 Most Recent: X (Date: -- )    Interpretation of Tool:  Represents activities that are increasingly more difficult (i.e. Bed mobility, Transfers, Gait). Score 24 23 22-20 19-15 14-10 9-7 6     Modifier CH CI CJ CK CL CM CN      ? Mobility - Walking and Moving Around:     - CURRENT STATUS: CJ - 20%-39% impaired, limited or restricted    - GOAL STATUS: CI - 1%-19% impaired, limited or restricted    - D/C STATUS:  ---------------To be determined---------------  [place] / [place] / Product Type: Medicare /      Medical Necessity:     · Patient demonstrates good rehab potential due to higher previous functional level. Reason for Services/Other Comments:  · Patient continues to require skilled intervention due to decreased balance and ambulation. Use of outcome tool(s) and clinical judgement create a POC that gives a: Clear prediction of patient's progress: LOW COMPLEXITY            TREATMENT:   (In addition to Assessment/Re-Assessment sessions the following treatments were rendered)   Pre-treatment Symptoms/Complaints:  \" I am ready to get up\"  Pain: Initial: 0/10     Post Session:  0/10     Therapeutic Exercise: (  ):  Exercises per grid below to improve strength. Required minimal visual and verbal cues to promote proper body alignment and promote proper body mechanics. Progressed repetitions and complexity of movement as indicated.        Date:  12/26/18 Date:   Date:     ACTIVITY/EXERCISE AM PM AM PM AM PM   Seated LAQ 1 x 15 B  1 x 20 B        Seated marching 1 x 15 B  1 x 20 B        Seated AP 1 x 20 B        Seated hip abd/add 1 x 15 B  1 x 20 B                                   B = bilateral; AA = active assistive; A = active; P = passive    Therapeutic Activity: (    23 minutes): Therapeutic activities including bed mobility, transfers and gait to improve mobility, strength, balance and coordination. Required min assist    to promote static and dynamic balance in sitting. Braces/Orthotics/Lines/Etc:   · IV  · O2 Device: Room air  Treatment/Session Assessment:    · Response to Treatment:  Tolerated well short, shuffled steps. · Interdisciplinary Collaboration:   o Physical Therapy Assistant  o Registered Nurse  o   · After treatment position/precautions:   o Up in chair  o Bed/Chair-wheels locked  o Call light within reach  o RN notified  o Family at bedside   · Compliance with Program/Exercises: Compliant all of the time  · Recommendations/Intent for next treatment session: \"Next visit will focus on advancements to more challenging activities and reduction in assistance provided\".   Total Treatment Duration:  PT Patient Time In/Time Out  Time In: 1057  Time Out: 1120    Santa Butler-Shira, PTA

## 2018-12-27 NOTE — PROGRESS NOTES
[address] has accepted patient. Family notified. Patient can discharge once medically stable. MD notified.

## 2018-12-27 NOTE — PROGRESS NOTES
Problem: Pressure Injury - Risk of  Goal: *Prevention of pressure injury  Document Bijan Scale and appropriate interventions in the flowsheet. Outcome: Progressing Towards Goal  Pressure Injury Interventions:  Sensory Interventions: Assess changes in LOC, Assess need for specialty bed, Minimize linen layers, Pressure redistribution bed/mattress (bed type)    Moisture Interventions: Apply protective barrier, creams and emollients, Absorbent underpads, Check for incontinence Q2 hours and as needed, Internal/External urinary devices, Minimize layers    Activity Interventions: Increase time out of bed, Pressure redistribution bed/mattress(bed type), PT/OT evaluation    Mobility Interventions: Assess need for specialty bed, Pressure redistribution bed/mattress (bed type), PT/OT evaluation    Nutrition Interventions: Document food/fluid/supplement intake    Friction and Shear Interventions: Feet elevated on foot rest, Foam dressings/transparent film/skin sealants, HOB 30 degrees or less, Lift sheet               Problem: Falls - Risk of  Goal: *Absence of Falls  Document Bella Fall Risk and appropriate interventions in the flowsheet.   Outcome: Progressing Towards Goal  Fall Risk Interventions:  Mobility Interventions: Bed/chair exit alarm, OT consult for ADLs, Patient to call before getting OOB, PT Consult for assist device competence         Medication Interventions: Bed/chair exit alarm, Evaluate medications/consider consulting pharmacy, Patient to call before getting OOB, Teach patient to arise slowly    Elimination Interventions: Call light in reach, Patient to call for help with toileting needs, Toileting schedule/hourly rounds    History of Falls Interventions: Bed/chair exit alarm, Door open when patient unattended, Evaluate medications/consider consulting pharmacy, Investigate reason for fall

## 2018-12-28 VITALS
DIASTOLIC BLOOD PRESSURE: 61 MMHG | WEIGHT: 118.6 LBS | OXYGEN SATURATION: 98 % | HEART RATE: 77 BPM | SYSTOLIC BLOOD PRESSURE: 156 MMHG | HEIGHT: 63 IN | BODY MASS INDEX: 21.02 KG/M2 | RESPIRATION RATE: 16 BRPM | TEMPERATURE: 98 F

## 2018-12-28 PROBLEM — R47.81 SLURRED SPEECH: Status: RESOLVED | Noted: 2018-12-24 | Resolved: 2018-12-28

## 2018-12-28 PROBLEM — R65.10 SIRS (SYSTEMIC INFLAMMATORY RESPONSE SYNDROME) (HCC): Status: ACTIVE | Noted: 2018-12-28

## 2018-12-28 PROBLEM — A41.9 SEPSIS (HCC): Status: RESOLVED | Noted: 2018-12-24 | Resolved: 2018-12-28

## 2018-12-28 LAB
GLUCOSE BLD STRIP.AUTO-MCNC: 90 MG/DL (ref 65–100)
GLUCOSE BLD STRIP.AUTO-MCNC: 94 MG/DL (ref 65–100)
MM INDURATION POC: 0 MM (ref 0–5)
PPD POC: NEGATIVE NEGATIVE

## 2018-12-28 PROCEDURE — 74011000250 HC RX REV CODE- 250: Performed by: FAMILY MEDICINE

## 2018-12-28 PROCEDURE — 74011250637 HC RX REV CODE- 250/637: Performed by: FAMILY MEDICINE

## 2018-12-28 PROCEDURE — 82962 GLUCOSE BLOOD TEST: CPT

## 2018-12-28 RX ORDER — FACIAL-BODY WIPES
10 EACH TOPICAL DAILY
Status: DISCONTINUED | OUTPATIENT
Start: 2018-12-28 | End: 2018-12-28 | Stop reason: HOSPADM

## 2018-12-28 RX ORDER — FACIAL-BODY WIPES
10 EACH TOPICAL
Qty: 7 SUPPOSITORY | Refills: 0 | Status: SHIPPED | OUTPATIENT
Start: 2018-12-28 | End: 2019-01-04

## 2018-12-28 RX ORDER — POLYETHYLENE GLYCOL 3350 17 G/17G
17 POWDER, FOR SOLUTION ORAL DAILY
Status: DISCONTINUED | OUTPATIENT
Start: 2018-12-28 | End: 2018-12-28 | Stop reason: HOSPADM

## 2018-12-28 RX ORDER — POLYETHYLENE GLYCOL 3350 17 G/17G
17 POWDER, FOR SOLUTION ORAL DAILY
Qty: 30 PACKET | Refills: 0 | Status: SHIPPED | OUTPATIENT
Start: 2018-12-28 | End: 2019-01-27

## 2018-12-28 RX ORDER — GUAIFENESIN 100 MG/5ML
81 LIQUID (ML) ORAL DAILY
Qty: 30 TAB | Refills: 1 | Status: SHIPPED | OUTPATIENT
Start: 2018-12-29

## 2018-12-28 RX ADMIN — BISACODYL 10 MG: 10 SUPPOSITORY RECTAL at 11:56

## 2018-12-28 RX ADMIN — Medication 10 ML: at 05:21

## 2018-12-28 RX ADMIN — POLYETHYLENE GLYCOL 3350 17 G: 17 POWDER, FOR SOLUTION ORAL at 11:56

## 2018-12-28 RX ADMIN — ASPIRIN 81 MG 81 MG: 81 TABLET ORAL at 09:14

## 2018-12-28 RX ADMIN — LEVOTHYROXINE SODIUM 50 MCG: 50 TABLET ORAL at 05:21

## 2018-12-28 RX ADMIN — OXYCODONE HYDROCHLORIDE AND ACETAMINOPHEN 500 MG: 500 TABLET ORAL at 09:14

## 2018-12-28 NOTE — PROGRESS NOTES
States that she has not had a BM since Tuesday and is requesting a laxative.  Encourage oral fluids and contact MD.

## 2018-12-28 NOTE — DISCHARGE SUMMARY
Hospitalist Discharge Summary     Admit Date:  2018  9:04 PM   Name:  Lauro Swift   Age:  80 y.o.  :  1926   MRN:  [id number]   PCP:  Elder Bamberger, DO  Treatment Team: Attending Provider: Greg Garcia DO; Consulting Provider: Cecilio Lanes, MD; Utilization Review: Bobby Slater RN; Care Manager: Keely Gaxiola[address]; Charge Nurse: Janell Tate Speech Language Pathologist: FATUMA Dominguez    Problem List for this Hospitalization:  Hospital Problems as of 2018 Date Reviewed: 2018          Codes Class Noted - Resolved POA    * (Principal) SIRS (systemic inflammatory response syndrome) ([facility] ICD-10-CM: R65.10  ICD-9-CM: 995.90  2018 - Present Unknown        Acute ischemic stroke ([facility] ICD-10-CM: I63.9  ICD-9-CM: 434.91  2018 - Present         HTN (hypertension) ICD-10-CM: I10  ICD-9-CM: 401.9  2018 - Present Unknown        Mixed hyperlipidemia ICD-10-CM: E78.2  ICD-9-CM: 272.2  2015 - Present Yes        Hypothyroidism ICD-10-CM: E03.9  ICD-9-CM: 244.9  2015 - Present Yes        RESOLVED: Sepsis ([facility] ICD-10-CM: A41.9  ICD-9-CM: 038.9, 995.91  2018 - 2018 Unknown        RESOLVED: Slurred speech ICD-10-CM: R47.81  ICD-9-CM: 784.59  2018 - 2018 Unknown                Admission HPI from 2018:    Patient is a 80 y.o. female who presented to the ED for a cc leg weakness bilaterally along with slurred speech. Patient has a hx significant for hypothyroidism, HLD, chronic LE weakness of unknown origin for the past two years, and HTN. Patient noticed that she was having a hard time getting up from her chair around 4:30pm today. At that time she also noted to have trouble with speech and some mild confusion. No hx of stroke. Only takes levothyroxine and amlodipine.      Vitals - temp 102.      Labs - WBC 12.7. UA negative. Chest x ray negative. Flu negative.     Hospital Course:  Initially admitted for sepsis- on vanc and zosyn- d/jennifer as no source of infection. Chronic debility- gradual decline since past 1 year. Daughter does say that she can walk with walker. PT was consulted. Rehab recommended. Pt was worked up for cva work up- with speech problems and? facial droop as per family. MRI brain negative for stroke. Neurology was consulted- felt pt symptoms from frontal lobe atrophy. Pt had constipation at admission- daughter says that pt has to disimpact at home from time to time. Chronic poor nutrition according to daughter- at times does eat better when taken out. Pt feels like she is eating good. Nutritional consult at rehab. Pt is stable for discharge. Follow up instructions below. Plan was discussed with pt and daughter. All questions answered. Patient was stable at time of discharge and was instructed to call or return if there are any concerns or recurrence of symptoms. Diagnostic Imaging/Tests:   Mri Brain Wo Cont    Result Date: 12/25/2018  History: Left facial droop, leg weakness, slurred speech. EXAM: MRI brain without contrast TECHNIQUE: Multiplanar multisequence imaging is performed COMPARISON: CT head without contrast dated 12/24/2018 FINDINGS: Diffusion-weighted imaging demonstrates no restricted diffusion to suggest an acute or early subacute infarct. Gradient echo sequence imaging demonstrates no blooming artifact to suggest retained intracranial blood products. There is generalized cerebral atrophy with ex vacuo dilatation of the ventricles. Normal flow voids are present within the major intracranial vessels. There is no mass or mass effect. There is no midline shift. The basilar cisterns are patent. The paranasal sinuses and mastoid air cells are clear. IMPRESSION: 1. No evidence of acute or early subacute infarct. 2. Generalized cerebral atrophy. There is ex vacuo dilation of the ventricles. Ct Head Wo Cont    Result Date: 12/25/2018  EXAM: Noncontrast CT head. DATE: December 24, 2018. INDICATION: Mental status changes. COMPARISON: None. TECHNIQUE: Noncontrast CT images of the head were obtained. Radiation dose reduction techniques were used for this study. Our CT scanners use one or all of the following:  Automated exposure control, adjustment of the mA or kV according to patient size, iterative reconstruction. FINDINGS: There is mild volume loss with chronic small vessel ischemic changes in the white matter. No acute infarct, hemorrhage or mass is seen. There is no mass effect, midline shift or evidence of hydrocephalus. The skull and skull base are within normal limits. The visualized paranasal sinuses and mastoid air cells are clear. IMPRESSION: No acute intracranial process. Ct Abd Pelv W Cont    Result Date: 12/25/2018  EXAM: CT abdomen and pelvis with IV contrast. DATE: December 24, 2018. INDICATION: Abdominal pain and fever. COMPARISON: None. TECHNIQUE: Axial CT images of the abdomen and pelvis were obtained after oral contrast and the intravenous injection of 100 mm Isovue-370 CT contrast. Radiation dose reduction techniques were used for this study. Our CT scanners use one or all of the following:  Automated exposure control, adjustment of the mA and/or kV according to patient size, iterative reconstruction. FINDINGS: There is moderate to marked constipation. The small bowel loops are within normal limits. No bowel wall thickening or obstruction is identified. The liver, gallbladder, pancreas, spleen, adrenal glands and urinary bladder are unremarkable. There are solitary bilateral kidney cysts, the largest on the left measuring 3.1 cm. No hydronephrosis is identified. The uterus is absent. There is no abdominal aortic aneurysm or dissection. No lymphadenopathy, ascites, free air or acute inflammatory process is identified. The lung bases are clear. IMPRESSION: 1. No acute intra-abdominal process. 2. Moderate to marked constipation. 3. Small bilateral kidney cysts.  4. Prior hysterectomy. Xr Chest Port    Result Date: 12/24/2018  EXAM: Chest x-ray. DATE: December 24, 2018. INDICATION: Fever and weakness. COMPARISON: None. TECHNIQUE: Single frontal view chest. FINDINGS: The cardiac silhouette is upper normal in size. The lungs are clear. No pneumothorax, pulmonary vascular congestion or pleural effusion is identified. IMPRESSION: No acute process. Duplex Carotid Bilateral    Result Date: 12/25/2018  HISTORY: Stroke with left hemiparesis FINDINGS: Duplex doppler carotid ultrasound exams performed of both the right and left side of the neck. NASCET criteria. Peak systolic velocity right common carotid artery 126 cm/s, right internal carotid of 146 cm/s with a ratio of 1.2 on the right. Right internal carotid artery end-diastolic velocity of 8 cm/s. Peak systolic velocity left common carotid artery 123 cm/s, internal carotid of 126 cm/s with a ratio of 1.0 on the left. Left internal carotid artery end-diastolic velocity of 10 cm/s. Antegrade flow right vertebral artery and antegrade flow left vertebral artery. Grayscale and color-flow imaging reveal bilateral carotid artery atherosclerosis. IMPRESSION: Bilateral carotid artery atherosclerosis. However, no hemodynamically significant internal carotid artery stenosis. Duplex Lower Ext Venous Right    Result Date: 12/25/2018  HISTORY: Chronic right lower extremity swelling and pain. Exam: Right lower extremity ultrasound Technique: Realtime grayscale and color Doppler imaging is performed in the longitudinal and transverse planes. FINDINGS: There is normal flow, augmentation, and compressibility within the deep venous system from the groin to popliteal fossa. Posterior tibial vein and peroneal vein are also normal. No Baker's cyst. IMPRESSIONS: No evidence of deep venous thrombosis within the right lower extremity.        Echocardiogram results:  Results for orders placed or performed during the hospital encounter of 18   2D ECHO COMPLETE ADULT (TTE) W OR WO CONTR    Narrative    [address] [facility] [address]  [address] [facility], [address] [facility]  [phone]    Transthoracic Echocardiogram  2D, M-mode, Doppler, and Color Doppler    Patient: Marlee Mars  MR #: [id number]  : 1926  Age: 80 years  Gender: Female  Study date: 26-Dec-2018  Account #: [de-identified]  Height: 63 in  Weight: 106.7 lb  BSA: 1.48 mï¾²  Status:Routine  Location: [facility]  BP: 128/ 70    Allergies: FISH OIL    Sonographer:  Dulce Blanco [facility]  Group:  [facility]  Referring Physician:  Ce Metz MD  Reading Physician:  Ajit Mak. Niels Gonzalez MD [facility]    INDICATIONS: CVA work up. PROCEDURE: This was a routine study. A transthoracic echocardiogram was  performed. The study included complete 2D imaging, M-mode, complete spectral  Doppler, and color Doppler. Image quality was adequate. LEFT VENTRICLE: Size was normal. Systolic function was normal. Ejection  fraction was estimated in the range of 55 % to 60 %. There were no regional  wall motion abnormalities. Wall thickness was normal. Avg. E/e'= 8.45. Left  ventricular diastolic function parameters were normal.    RIGHT VENTRICLE: The size was normal. Systolic function was normal. Estimated  peak pressure was in the range of 30-35 mmHg. LEFT ATRIUM: Size was normal.    ATRIAL SEPTUM: Contrast injection was performed. There was no right-to-left  shunt, with provocative maneuvers to increase right atrial pressure. RIGHT ATRIUM: Size was normal.    SYSTEMIC VEINS: IVC: The inferior vena cava was normal in size and course. AORTIC VALVE: The valve was structurally normal, tri-commissural. There was   no  evidence for stenosis. There was no insufficiency. MITRAL VALVE: Valve structure was normal. There was no evidence for stenosis. There was trivial regurgitation. TRICUSPID VALVE: The valve structure was normal. There was no evidence for  stenosis. There was mild regurgitation. PULMONIC VALVE: Not well visualized. There was no evidence for stenosis. There  was no insufficiency. PERICARDIUM: There was no pericardial effusion. AORTA: The root exhibited normal size. SUMMARY:    -  Left ventricle: Systolic function was normal. Ejection fraction was  estimated in the range of 55 % to 60 %. There were no regional wall motion  abnormalities. -  Atrial septum: Contrast injection was performed. There was no   right-to-left  shunt, with provocative maneuvers to increase right atrial pressure. -  Tricuspid valve: There was mild regurgitation. SYSTEM MEASUREMENT TABLES    2D mode  AoR Diam (2D): 2.9 cm  LA Dimension (2D): 3.3 cm  Left Atrium Systolic Volume Index; Method of Disks, Biplane; 2D mode;: 33.2  ml/m2  IVS/LVPW (2D): 0.8  IVSd (2D): 0.8 cm  LVIDd (2D): 3.6 cm  LVIDs (2D): 1.8 cm  LVOT Area (2D): 2.5 cm2  LVPWd (2D): 0.9 cm  RVIDd (2D): 3 cm    Unspecified Scan Mode  Peak Grad; Mean; Antegrade Flow: 9 mm[Hg]  Vmax; Antegrade Flow: 146 cm/s  LVOT Diam: 1.8 cm  MV Peak Stiven/LV Peak Tissue Stiven E-Wave; Lateral MA: 7.9  MV Peak Stiven/LV Peak Tissue Stiven E-Wave; Medial MA: 9    Prepared and signed by    Duaine Cheadle A. Brigitte Hua, MD [facility]  Signed 26-Dec-2018 12:20:19           All Micro Results     Procedure Component Value Units Date/Time    CULTURE, BLOOD [[id number]] Collected:  12/24/18 2200    Order Status:  Completed Specimen:  Blood Updated:  12/28/18 0721     Special Requests: --        RIGHT  FOREARM       Culture result: NO GROWTH 4 DAYS       CULTURE, BLOOD [[id number]] Collected:  12/24/18 2203    Order Status:  Completed Specimen:  Blood Updated:  12/28/18 0721     Special Requests: --        NO SPECIAL REQUESTS  LEFT  HAND       Culture result: NO GROWTH 4 DAYS       CULTURE, URINE [[id number]] Collected:  12/24/18 2139    Order Status:  Completed Specimen:  Cath Urine Updated:  12/27/18 0645     Special Requests: NO SPECIAL REQUESTS Culture result: NO GROWTH 2 DAYS       INFLUENZA A & B AG (RAPID TEST) [[id number]] Collected:  12/24/18 2202    Order Status:  Completed Specimen:  Nasopharyngeal from Nasal washing Updated:  12/24/18 2232     Influenza A Ag NEGATIVE         Comment: NEGATIVE FOR THE PRESENCE OF INFLUENZA A ANTIGEN  INFECTION DUE TO INFLUENZA A CANNOT BE RULED OUT. BECAUSE THE ANTIGEN PRESENT IN THE SAMPLE MAY BE BELOW  THE DETECTION LIMIT OF THE TEST. A NEGATIVE TEST IS PRESUMPTIVE AND IT IS RECOMMENDED THAT THESE RESULTS BE CONFIRMED BY VIRAL CULTURE OR AN FDA-CLEARED INFLUENZA A AND B MOLECULAR ASSAY. Influenza B Ag NEGATIVE         Comment: NEGATIVE FOR THE PRESENCE OF INFLUENZA B ANTIGEN  INFECTION DUE TO INFLUENZA B CANNOT BE RULED OUT. BECAUSE THE ANTIGEN PRESENT IN THE SAMPLE MAY BE BELOW  THE DETECTION LIMIT OF THE TEST. A NEGATIVE TEST IS PRESUMPTIVE AND IT IS RECOMMENDED THAT THESE RESULTS BE CONFIRMED BY VIRAL CULTURE OR AN FDA-CLEARED INFLUENZA A AND B MOLECULAR ASSAY. Source NASOPHARYNGEAL             Labs: Results:       BMP, Mg, Phos No results for input(s): NA, K, CL, CO2, AGAP, BUN, CREA, CA, GLU, MG, PHOS in the last 72 hours. CBC No results for input(s): WBC, RBC, HGB, HCT, PLT, GRANS, LYMPH, EOS, MONOS, BASOS, IG, ANEU, ABL, DENAE, ABM, ABB, AIG, HGBEXT, HCTEXT, PLTEXT in the last 72 hours. LFT No results for input(s): SGOT, ALT, TBIL, AP, TP, ALB, GLOB, AGRAT, GPT in the last 72 hours.    Cardiac Testing Lab Results   Component Value Date/Time    Troponin-I, Qt. 0.05 12/25/2018 05:00 AM      Coagulation Tests No results found for: PTP, INR, APTT   A1c Lab Results   Component Value Date/Time    Hemoglobin A1c 5.4 12/25/2018 05:00 AM    Hemoglobin A1c 5.6 06/06/2017 02:48 PM    Hemoglobin A1c 5.9 (H) 12/06/2016 02:16 PM      Lipid Panel Lab Results   Component Value Date/Time    Cholesterol, total 143 12/25/2018 05:00 AM    HDL Cholesterol 59 12/25/2018 05:00 AM    LDL, calculated 73.6 12/25/2018 05:00 AM    VLDL, calculated 10.4 12/25/2018 05:00 AM    Triglyceride 52 12/25/2018 05:00 AM    CHOL/HDL Ratio 2.4 12/25/2018 05:00 AM      Thyroid Panel Lab Results   Component Value Date/Time    T4, Total 5.9 06/19/2015 10:41 AM    T4, Total 7.4 12/15/2014 10:00 AM    TSH 0.611 12/25/2018 05:00 AM    TSH 1.760 12/04/2018 10:21 AM        Most Recent UA No results found for: COLOR, APPRN, REFSG, HUGO, PROTU, GLUCU, KETU, BILU, BLDU, UROU, LUZ MARINA, LEUKU     Allergies   Allergen Reactions    Fish Oil Nausea and Vomiting     Immunization History   Administered Date(s) Administered    Influenza High Dose Vaccine PF 10/20/2016, 12/12/2017, 10/18/2018    Influenza Vaccine 09/29/2011, 10/02/2012, 10/09/2014, 10/20/2015    Pneumococcal Conjugate (PCV-13) 12/12/2018    Pneumococcal Polysaccharide (PPSV-23) 01/05/2006    TB Skin Test (PPD) Intradermal 12/25/2018       All Labs from Last 24 Hrs:  Recent Results (from the past 24 hour(s))   GLUCOSE, POC    Collection Time: 12/27/18 11:47 AM   Result Value Ref Range    Glucose (POC) 101 (H) 65 - 100 mg/dL   GLUCOSE, POC    Collection Time: 12/27/18  4:13 PM   Result Value Ref Range    Glucose (POC) 138 (H) 65 - 100 mg/dL   GLUCOSE, POC    Collection Time: 12/27/18  9:26 PM   Result Value Ref Range    Glucose (POC) 123 (H) 65 - 100 mg/dL   PLEASE READ & DOCUMENT PPD TEST IN 72 HRS    Collection Time: 12/28/18  1:00 AM   Result Value Ref Range    PPD negative Negative    mm Induration 0 mm   GLUCOSE, POC    Collection Time: 12/28/18  7:25 AM   Result Value Ref Range    Glucose (POC) 94 65 - 100 mg/dL       Discharge Exam:  Patient Vitals for the past 24 hrs:   Temp Pulse Resp BP SpO2   12/28/18 0807 98.3 °F (36.8 °C) 75 16 135/66 95 %   12/28/18 0421 98 °F (36.7 °C) 69 18 156/72 93 %   12/27/18 2305 98.1 °F (36.7 °C) 70 18 151/71 94 %   12/27/18 1858 98 °F (36.7 °C) 74 16 133/72 95 %   12/27/18 1527 98.4 °F (36.9 °C) 78 18 128/51 97 %   12/27/18 1148 98 °F (36.7 °C) 72 18 140/50 97 %     Oxygen Therapy  O2 Sat (%): 95 % (12/28/18 0807)  Pulse via Oximetry: 80 beats per minute (12/24/18 2315)  O2 Device: Room air (12/24/18 0782)    Intake/Output Summary (Last 24 hours) at 12/28/2018 1111  Last data filed at 12/28/2018 0846  Gross per 24 hour   Intake 720 ml   Output 2550 ml   Net -1830 ml       General:    Well nourished. Alert. No distress. Eyes:   Normal sclera. Extraocular movements intact. chrionic mild droop rt eye lid  ENT:  Normocephalic, atraumatic. Moist mucous membranes  CV:   Regular rate and rhythm. No murmur, rub, or gallop. Lungs:  Clear to auscultation bilaterally. No wheezing, rhonchi, or rales. Abdomen: Soft, nontender, nondistended. Bowel sounds normal.   Extremities: Warm and dry. No cyanosis or edema. Neurologic: CN II-XII grossly intact. Sensation intact. Skin:     No rashes or jaundice. Psych:  Normal mood and affect. Discharge Info:   Current Discharge Medication List      START taking these medications    Details   aspirin 81 mg chewable tablet Take 1 Tab by mouth daily. Qty: 30 Tab, Refills: 1      bisacodyl (DULCOLAX) 10 mg suppository Insert 10 mg into rectum daily as needed for up to 7 days. Qty: 7 Suppository, Refills: 0      polyethylene glycol (MIRALAX) 17 gram packet Take 1 Packet by mouth daily for 30 days. Qty: 30 Packet, Refills: 0         CONTINUE these medications which have NOT CHANGED    Details   levothyroxine (SYNTHROID) 50 mcg tablet Take 1 Tab by mouth Daily (before breakfast). Qty: 90 Tab, Refills: 4    Associated Diagnoses: Hypothyroidism due to acquired atrophy of thyroid; Mixed hyperlipidemia      amLODIPine (NORVASC) 5 mg tablet Take 1 Tab by mouth daily. Qty: 90 Tab, Refills: 5      ascorbic acid (VITAMIN C) 500 mg tablet Take 500 mg by mouth two (2) times a day. calcium carbonate-simethicone 1,000-60 mg chew Take 1,200 mg by mouth daily. multivitamin (ONE A DAY) tablet Take 1 Tab by mouth daily. CHOLECALCIFEROL, VITAMIN D3, (VITAMIN D3 PO) Take  by mouth. Disposition: Rehab  Activity: As per rehab. Diet: DIET CARDIAC Regular  DIET NUTRITIONAL SUPPLEMENTS All Meals; Ensure Enlive    Follow-up Appointments   Procedures    FOLLOW UP VISIT Appointment in: One Week Cbc and bmp in a week. F/u with pcp 1 week after discharge from rehab. Nutritional consult. Cbc and bmp in a week. F/u with pcp 1 week after discharge from rehab. Nutritional consult. Standing Status:   Standing     Number of Occurrences:   1     Order Specific Question:   Appointment in     Answer: One Week         Follow-up Information     Follow up With Specialties Details [place] [id number],  Internal Medicine   [address] [facility] [address] [facility] [id number]  [phone]            Time spent in patient discharge planning and coordination 35 minutes.     Signed:  Thea Quintero MD

## 2018-12-28 NOTE — PROGRESS NOTES
Hourly rounds performed; all pt needs met. No acute clinical changes this shift. Bed L/L, SR up x2, call light/ personal items within reach. Bedside shift report to Patrisha Bamberger, RN.

## 2018-12-28 NOTE — PROGRESS NOTES
....TRANSFER - OUT REPORT: 
 
Verbal report given to Antonina Flower  on Percy Oliveira  being transferred to  [facility] room 14D Report consisted of patients Situation, Background, Assessment and  
Recommendations(SBAR). Information from the following report was reviewed with the receiving nurse. Lines:  Both lines were removed. Opportunity for questions and clarification was provided. Patient transported with EMS transport.

## 2018-12-28 NOTE — PROGRESS NOTES
Patient to discharge to [facility] rehab room 14D. Report line, [phone] given to [employer]. Chryl Bear transportation setup for [employer]. Family and patient aware.

## 2018-12-28 NOTE — PROGRESS NOTES
LATE NOTE: In accordance with Medicare Guidelines, a copy of the Important Letter from Medicare was presented to the patient's daughter in anticipation of expected discharge within 48 hours. An oral explanation was provided and all questions were answered. Patient's daughter signed one copy of the Important Letter from Medicare which was placed in the patient's medical chart, and a copy of the Important Letter from Medicare was provided to the patient's daughter. Care Managers were notified.

## 2018-12-29 LAB
BACTERIA SPEC CULT: NORMAL
BACTERIA SPEC CULT: NORMAL
SERVICE CMNT-IMP: NORMAL
SERVICE CMNT-IMP: NORMAL

## 2018-12-31 ENCOUNTER — PATIENT OUTREACH (OUTPATIENT)
Dept: CASE MANAGEMENT | Age: 83
End: 2018-12-31

## 2018-12-31 NOTE — PROGRESS NOTES
This note will not be viewable in [address] [facility] outreach postponed for 7 days due to discharge to [address] [place] [address].

## 2019-01-07 ENCOUNTER — PATIENT OUTREACH (OUTPATIENT)
Dept: CASE MANAGEMENT | Age: 84
End: 2019-01-07

## 2019-01-07 NOTE — PROGRESS NOTES
This note will not be viewable in [address] 1st Attempt to contact patient for [facility] call after 7 day deferment for DC to Rehab, no answer left VM for returned call. Will attempt to contact patient again within 24 hours

## 2019-01-08 ENCOUNTER — PATIENT OUTREACH (OUTPATIENT)
Dept: CASE MANAGEMENT | Age: 84
End: 2019-01-08

## 2019-01-08 NOTE — PROGRESS NOTES
This note will not be viewable in [address] Called and spoke with patients daughter. She states that the patient is still in Rehab and does not have a scheduled DC date at this time. Care Coordinator will FU in 7 days.

## 2019-01-22 ENCOUNTER — PATIENT OUTREACH (OUTPATIENT)
Dept: CASE MANAGEMENT | Age: 84
End: 2019-01-22

## 2019-01-22 NOTE — PROGRESS NOTES
This note will not be viewable in [address] 3rd attempt to contact patient as has been in Rehab for [facility] Call. No answer, left VM. Episode will be closed at this time as [address] has been unsuccessful in reaching the patient.

## 2019-03-08 ENCOUNTER — HOSPITAL ENCOUNTER (OUTPATIENT)
Dept: GENERAL RADIOLOGY | Age: 84
Discharge: HOME | End: 2019-03-08
Payer: MEDICARE

## 2019-03-08 VITALS — HEIGHT: 60 IN | BODY MASS INDEX: 21.2 KG/M2 | WEIGHT: 108 LBS

## 2019-03-08 DIAGNOSIS — R47.02 DYSPHASIA: ICD-10-CM

## 2019-03-08 DIAGNOSIS — K21.9 GERD (GASTROESOPHAGEAL REFLUX DISEASE): ICD-10-CM

## 2019-03-08 PROCEDURE — 74011000255 HC RX REV CODE- 255: Performed by: NURSE PRACTITIONER

## 2019-03-08 PROCEDURE — 74220 X-RAY XM ESOPHAGUS 1CNTRST: CPT

## 2019-03-08 PROCEDURE — 74011000250 HC RX REV CODE- 250: Performed by: NURSE PRACTITIONER

## 2019-03-08 RX ADMIN — BARIUM SULFATE 135 ML: 980 POWDER, FOR SUSPENSION ORAL at 13:18

## 2019-03-08 RX ADMIN — BARIUM SULFATE 700 MG: 700 TABLET ORAL at 13:18

## 2019-03-08 RX ADMIN — ANTACID/ANTIFLATULENT 4 G: 380; 550; 10; 10 GRANULE, EFFERVESCENT ORAL at 13:17

## 2019-09-11 ENCOUNTER — HOSPITAL ENCOUNTER (EMERGENCY)
Age: 84
Discharge: HOME | End: 2019-09-11
Attending: EMERGENCY MEDICINE
Payer: MEDICARE

## 2019-09-11 ENCOUNTER — APPOINTMENT (OUTPATIENT)
Dept: GENERAL RADIOLOGY | Age: 84
End: 2019-09-11
Attending: EMERGENCY MEDICINE
Payer: MEDICARE

## 2019-09-11 VITALS
HEIGHT: 62 IN | SYSTOLIC BLOOD PRESSURE: 147 MMHG | OXYGEN SATURATION: 96 % | RESPIRATION RATE: 18 BRPM | TEMPERATURE: 97.9 F | DIASTOLIC BLOOD PRESSURE: 71 MMHG | WEIGHT: 115 LBS | BODY MASS INDEX: 21.16 KG/M2 | HEART RATE: 67 BPM

## 2019-09-11 DIAGNOSIS — S89.92XA INJURY OF LEFT KNEE, INITIAL ENCOUNTER: Primary | ICD-10-CM
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
## 2019-09-11 DIAGNOSIS — R74.8 ELEVATED CK: ICD-10-CM

## 2019-09-11 LAB
ALBUMIN SERPL-MCNC: 3.4 G/DL (ref 3.2–4.6)
ALBUMIN/GLOB SERPL: 0.9 {RATIO} (ref 1.2–3.5)
ALP SERPL-CCNC: 64 U/L (ref 50–136)
ALT SERPL-CCNC: 26 U/L (ref 12–65)
ANION GAP SERPL CALC-SCNC: 6 MMOL/L (ref 7–16)
AST SERPL-CCNC: 36 U/L (ref 15–37)
BASOPHILS # BLD: 0 K/UL (ref 0–0.2)
BASOPHILS NFR BLD: 1 % (ref 0–2)
BILIRUB SERPL-MCNC: 0.7 MG/DL (ref 0.2–1.1)
BUN SERPL-MCNC: 16 MG/DL (ref 8–23)
CALCIUM SERPL-MCNC: 9 MG/DL (ref 8.3–10.4)
CHLORIDE SERPL-SCNC: 101 MMOL/L (ref 98–107)
CK SERPL-CCNC: 618 U/L (ref 21–215)
CO2 SERPL-SCNC: 30 MMOL/L (ref 21–32)
CREAT SERPL-MCNC: 0.68 MG/DL (ref 0.6–1)
DIFFERENTIAL METHOD BLD: ABNORMAL
EOSINOPHIL # BLD: 0.2 K/UL (ref 0–0.8)
EOSINOPHIL NFR BLD: 3 % (ref 0.5–7.8)
ERYTHROCYTE [DISTWIDTH] IN BLOOD BY AUTOMATED COUNT: 13.2 % (ref 11.9–14.6)
GLOBULIN SER CALC-MCNC: 4 G/DL (ref 2.3–3.5)
GLUCOSE SERPL-MCNC: 105 MG/DL (ref 65–100)
HCT VFR BLD AUTO: 36.8 % (ref 35.8–46.3)
HGB BLD-MCNC: 11.8 G/DL (ref 11.7–15.4)
IMM GRANULOCYTES # BLD AUTO: 0 K/UL (ref 0–0.5)
IMM GRANULOCYTES NFR BLD AUTO: 0 % (ref 0–5)
LYMPHOCYTES # BLD: 1 K/UL (ref 0.5–4.6)
LYMPHOCYTES NFR BLD: 18 % (ref 13–44)
MCH RBC QN AUTO: 30.6 PG (ref 26.1–32.9)
MCHC RBC AUTO-ENTMCNC: 32.1 G/DL (ref 31.4–35)
MCV RBC AUTO: 95.3 FL (ref 79.6–97.8)
MONOCYTES # BLD: 0.6 K/UL (ref 0.1–1.3)
MONOCYTES NFR BLD: 10 % (ref 4–12)
NEUTS SEG # BLD: 4 K/UL (ref 1.7–8.2)
NEUTS SEG NFR BLD: 69 % (ref 43–78)
NRBC # BLD: 0 K/UL (ref 0–0.2)
PLATELET # BLD AUTO: 205 K/UL (ref 150–450)
PMV BLD AUTO: 10.4 FL (ref 9.4–12.3)
POTASSIUM SERPL-SCNC: 3.8 MMOL/L (ref 3.5–5.1)
PROT SERPL-MCNC: 7.4 G/DL (ref 6.3–8.2)
RBC # BLD AUTO: 3.86 M/UL (ref 4.05–5.2)
SODIUM SERPL-SCNC: 137 MMOL/L (ref 136–145)
WBC # BLD AUTO: 5.8 K/UL (ref 4.3–11.1)

## 2019-09-11 PROCEDURE — L1830 KO IMMOB CANVAS LONG PRE OTS: HCPCS

## 2019-09-11 PROCEDURE — 80053 COMPREHEN METABOLIC PANEL: CPT

## 2019-09-11 PROCEDURE — 96361 HYDRATE IV INFUSION ADD-ON: CPT | Performed by: EMERGENCY MEDICINE

## 2019-09-11 PROCEDURE — 82550 ASSAY OF CK (CPK): CPT

## 2019-09-11 PROCEDURE — 99283 EMERGENCY DEPT VISIT LOW MDM: CPT | Performed by: EMERGENCY MEDICINE

## 2019-09-11 PROCEDURE — 74011250636 HC RX REV CODE- 250/636: Performed by: EMERGENCY MEDICINE

## 2019-09-11 PROCEDURE — 85025 COMPLETE CBC W/AUTO DIFF WBC: CPT

## 2019-09-11 PROCEDURE — 73562 X-RAY EXAM OF KNEE 3: CPT

## 2019-09-11 PROCEDURE — 96360 HYDRATION IV INFUSION INIT: CPT | Performed by: EMERGENCY MEDICINE

## 2019-09-11 RX ADMIN — SODIUM CHLORIDE 500 ML: 900 INJECTION, SOLUTION INTRAVENOUS at 16:50

## 2019-09-11 NOTE — ED NOTES
I have reviewed discharge instructions with the patient. The patient verbalized understanding. Patient left ED via Discharge Method: stretcher to Home with [facility]  Opportunity for questions and clarification provided. Patient given 0 scripts. To continue your aftercare when you leave the hospital, you may receive an automated call from our care team to check in on how you are doing. This is a free service and part of our promise to provide the best care and service to meet your aftercare needs.  If you have questions, or wish to unsubscribe from this service please call [phone]. Thank you for Choosing our [facility] Emergency Department.

## 2019-09-11 NOTE — PROGRESS NOTES
Patient and her daughter open to [address] if not admitted. SW following and will schedule home health.

## 2019-09-11 NOTE — ED PROVIDER NOTES
Was trying to transfer to her toilet. She had a fall into the floor with no reported syncope. She normally uses a walker and is very poorly mobilized on this. Probably 5 or 6 hours. She has a life alert but did not wish to push it evidently. Has chronic back issues as well. These are unchanged. She mainly has pain to her left knee. Is mentally at her baseline. She does not have history of striking her head. No neck pain. Intact patellar function. Is sore to move her left knee though. She on her best days has some challenges with transition. Patient's daughter lives fairly close about 10 minutes away deals with her mother's needs almost on a daily basis. Has encouraged her mother to move in with them to make things a little easier which is not occurred at this point. Patient if transition to a different living setting other than probably a level beyond assisted living    The history is provided by the patient. Knee Pain    This is a new problem. The problem occurs constantly. The pain is present in the left knee. The pain is moderate. Associated symptoms include stiffness. Pertinent negatives include no numbness. The symptoms are aggravated by palpation and movement. She has tried nothing for the symptoms. There has been a history of trauma.         Past Medical History:   Diagnosis Date    Acute lymphadenitis     Backache, unspecified     Elevated blood pressure reading without diagnosis of hypertension     Esophageal reflux     Hyperosmolality and/or hypernatremia 2/6/2015    Hypothyroidism 2/6/2015    Impaired fasting glucose 2/6/2015    Mixed hyperlipidemia 2/6/2015    Other constipation     Unspecified venous (peripheral) insufficiency     Viral warts, unspecified 2/6/2015       Past Surgical History:   Procedure Laterality Date    HX HERNIA REPAIR      HX HYSTERECTOMY      TOTAL         Family History:   Problem Relation Age of Onset    Heart Disease Other Arteriosclerotic Cardiovascular Disease    Heart Disease Sister     Diabetes Brother     Heart Disease Brother     Breast Cancer Cousin         2 1st cousins and her jeff's daughter.  Ovarian Cancer Neg Hx     Colon Cancer Neg Hx     Prostate Cancer Neg Hx     Deep Vein Thrombosis Neg Hx     Pulmonary Embolism Neg Hx        Social History     Socioeconomic History    Marital status:      Spouse name: Not on file    Number of children: Not on file    Years of education: Not on file    Highest education level: Not on file   Occupational History    Not on file   Social Needs    Financial resource strain: Not on file    Food insecurity:     Worry: Not on file     Inability: Not on file    Transportation needs:     Medical: Not on file     Non-medical: Not on file   Tobacco Use    Smoking status: Never Smoker    Smokeless tobacco: Never Used   Substance and Sexual Activity    Alcohol use: No     Alcohol/week: 0.0 standard drinks    Drug use: No    Sexual activity: Never     Birth control/protection: None   Lifestyle    Physical activity:     Days per week: Not on file     Minutes per session: Not on file    Stress: Not on file   Relationships    Social connections:     Talks on phone: Not on file     Gets together: Not on file     Attends Anabaptism service: Not on file     Active member of club or organization: Not on file     Attends meetings of clubs or organizations: Not on file     Relationship status: Not on file    Intimate partner violence:     Fear of current or ex partner: Not on file     Emotionally abused: Not on file     Physically abused: Not on file     Forced sexual activity: Not on file   Other Topics Concern    Not on file   Social History Narrative    1. Daughter Nomi Rajan. ALLERGIES: Fish oil    Review of Systems   Constitutional: Negative for chills and fever. HENT: Negative. Respiratory: Negative. Cardiovascular: Negative. Gastrointestinal: Negative. Musculoskeletal: Positive for arthralgias, joint swelling and stiffness. Neurological: Negative for dizziness, facial asymmetry and numbness. Psychiatric/Behavioral: Negative. All other systems reviewed and are negative. Vitals:    09/11/19 1439   BP: 147/71   Pulse: 67   Resp: 18   Temp: 97.9 °F (36.6 °C)   SpO2: 96%   Weight: 52.2 kg (115 lb)   Height: 5' 2\" (1.575 m)            Physical Exam   Constitutional: She appears well-developed and well-nourished. No distress. thin frail elderly   HENT:   Right Ear: External ear normal.   Left Ear: External ear normal.   Mouth/Throat: Oropharynx is clear and moist.   Eyes: Conjunctivae are normal. No scleral icterus. Neck: Neck supple. Cardiovascular: Normal rate and regular rhythm. No murmur heard. Pulmonary/Chest: Effort normal. No respiratory distress. She has no wheezes. Abdominal: Soft. She exhibits no distension. There is no tenderness. Musculoskeletal: Normal range of motion. She exhibits tenderness. Right elbow: Normal.       Left elbow: Normal.        Right wrist: Normal.        Left wrist: Normal.        Right knee: Normal.        Left knee: She exhibits swelling, effusion and ecchymosis. Tenderness found. Right ankle: Normal.        Left ankle: Normal.        Cervical back: Normal.        Thoracic back: Normal.        Lumbar back: Normal.   Neurological: She is alert. Skin: Skin is warm. No erythema. No pallor. Psychiatric: Her behavior is normal. Thought content normal.   Nursing note and vitals reviewed. MDM  Number of Diagnoses or Management Options  Elevated CK: Fall, initial encounter:   Injury of left knee, initial encounter:   Diagnosis management comments: Non-syncopal fall / discussed observation with solo child/daughter. After review with  plan is to discharge and they will assist with at home health and long term care. Aware needs increased fluids. Amount and/or Complexity of Data Reviewed  Clinical lab tests: reviewed and ordered  Decide to obtain previous medical records or to obtain history from someone other than the patient: yes  Obtain history from someone other than the patient: yes (Daughter at length)  Independent visualization of images, tracings, or specimens: yes    Risk of Complications, Morbidity, and/or Mortality  Presenting problems: moderate  Diagnostic procedures: low  Management options: moderate    Patient Progress  Patient progress: stable         Procedures    Recent Results (from the past 12 hour(s))   CBC WITH AUTOMATED DIFF    Collection Time: 09/11/19  2:42 PM   Result Value Ref Range    WBC 5.8 4.3 - 11.1 K/uL    RBC 3.86 (L) 4.05 - 5.2 M/uL    HGB 11.8 11.7 - 15.4 g/dL    HCT 36.8 35.8 - 46.3 %    MCV 95.3 79.6 - 97.8 FL    MCH 30.6 26.1 - 32.9 PG    MCHC 32.1 31.4 - 35.0 g/dL    RDW 13.2 11.9 - 14.6 %    PLATELET 460 438 - 657 K/uL    MPV 10.4 9.4 - 12.3 FL    ABSOLUTE NRBC 0.00 0.0 - 0.2 K/uL    DF AUTOMATED      NEUTROPHILS 69 43 - 78 %    LYMPHOCYTES 18 13 - 44 %    MONOCYTES 10 4.0 - 12.0 %    EOSINOPHILS 3 0.5 - 7.8 %    BASOPHILS 1 0.0 - 2.0 %    IMMATURE GRANULOCYTES 0 0.0 - 5.0 %    ABS. NEUTROPHILS 4.0 1.7 - 8.2 K/UL    ABS. LYMPHOCYTES 1.0 0.5 - 4.6 K/UL    ABS. MONOCYTES 0.6 0.1 - 1.3 K/UL    ABS. EOSINOPHILS 0.2 0.0 - 0.8 K/UL    ABS. BASOPHILS 0.0 0.0 - 0.2 K/UL    ABS. IMM.  GRANS. 0.0 0.0 - 0.5 K/UL   METABOLIC PANEL, COMPREHENSIVE    Collection Time: 09/11/19  2:42 PM   Result Value Ref Range    Sodium 137 136 - 145 mmol/L    Potassium 3.8 3.5 - 5.1 mmol/L    Chloride 101 98 - 107 mmol/L    CO2 30 21 - 32 mmol/L    Anion gap 6 (L) 7 - 16 mmol/L    Glucose 105 (H) 65 - 100 mg/dL    BUN 16 8 - 23 MG/DL    Creatinine 0.68 0.6 - 1.0 MG/DL    GFR est AA >60 >60 ml/min/1.73m2    GFR est non-AA >60 >60 ml/min/1.73m2    Calcium 9.0 8.3 - 10.4 MG/DL    Bilirubin, total 0.7 0.2 - 1.1 MG/DL    ALT (SGPT) 26 12 - 65 U/L    AST (SGOT) 36 15 - 37 U/L    Alk.  phosphatase 64 50 - 136 U/L    Protein, total 7.4 6.3 - 8.2 g/dL    Albumin 3.4 3.2 - 4.6 g/dL    Globulin 4.0 (H) 2.3 - 3.5 g/dL    A-G Ratio 0.9 (L) 1.2 - 3.5     CK    Collection Time: 09/11/19  2:42 PM   Result Value Ref Range     (H) 21 - 215 U/L

## 2019-09-11 NOTE — ED TRIAGE NOTES
Pt fell about 35 hours ago. Laid on the floor for several hours unable to get up. Felt pop in left knee. Left knee swollen.

## 2019-09-11 NOTE — DISCHARGE INSTRUCTIONS
Extra fluids  Social workers are to be helping you have in-house assistance if possible  Tylenol for discomfort  Assistance with any transition  Consideration moving forward alternate level of assistance in living situation with possible transition to non-home-based

## 2019-09-12 ENCOUNTER — HOME HEALTH ADMISSION (OUTPATIENT)
Dept: HOME HEALTH SERVICES | Facility: HOME HEALTH | Age: 84
End: 2019-09-12

## 2019-09-12 NOTE — PROGRESS NOTES
[address]  Face to Face Encounter    Patients Name: Edith Apley    YOB: 1926    Ordering Physician: Chon Mccord MD    Primary Diagnosis: Injury of left knee, initial encounter Abisai Eric Fall, initial encounter [W19. XXXA] Elevated CK [R74.8]         Date of Face to Face:   9/11/2019                                  Face to Face Encounter findings are related to primary reason for home care:   yes. 1. I certify that the patient needs intermittent care as follows: skilled nursing care:  skilled observation/assessment, patient education  physical therapy: strengthening, transfer training, gait/stair training, balance training and pt/caregiver education  occupational therapy:  ADL safety (ie. cooking, bathing, dressing) and pt/caregiver education    2. I certify that this patient is homebound, that is: 1) patient requires the use of a walker device, special transportation, or assistance of another to leave the home; or 2) patient's condition makes leaving the home medically contraindicated; and 3) patient has a normal inability to leave the home and leaving the home requires considerable and taxing effort. Patient may leave the home for infrequent and short duration for medical reasons, and occasional absences for non-medical reasons. Homebound status is due to the following functional limitations: Patient with strength deficits limiting the performance of all ADL's without caregiver assistance or the use of an assistive device. Patient with poor safety awareness and is at risk for falls without assistance of another person and the use of an assistive device. Patient with poor ambulation endurance limiting their safe ability to ascend/descend the required number of steps to leave the home.     3. I certify that this patient is under my care and that I, or a nurse practitioner or [phone], or clinical nurse specialist, or certified nurse midwife, working with me, had a Face-to-Face Encounter that meets the physician Face-to-Face Encounter requirements. The following are the clinical findings from the [address] [place] [address] encounter that support the need for skilled services and is a summary of the encounter:     See attached progess note      Migdalia Elaine  9/12/2019      THE FOLLOWING TO BE COMPLETED BY THE COMMUNITY PHYSICIAN:    I concur with the findings described above from the F2F encounter that this patient is homebound and in need of a skilled service.     Certifying Physician: _____________________________________      Printed Certifying Physician Name: _____________________________________    Date: _________________

## 2019-09-17 PROBLEM — I73.9 PERIPHERAL VASCULAR DISEASE (HCC): Status: ACTIVE | Noted: 2019-09-17

## 2019-09-18 ENCOUNTER — HOME CARE VISIT (OUTPATIENT)
Dept: SCHEDULING | Facility: HOME HEALTH | Age: 84
End: 2019-09-18

## 2019-09-27 ENCOUNTER — HOME HEALTH ADMISSION (OUTPATIENT)
Dept: HOME HEALTH SERVICES | Facility: HOME HEALTH | Age: 84
End: 2019-09-27
Payer: MEDICARE

## 2019-10-08 ENCOUNTER — HOME CARE VISIT (OUTPATIENT)
Dept: SCHEDULING | Facility: HOME HEALTH | Age: 84
End: 2019-10-08
Payer: MEDICARE

## 2019-10-08 VITALS
RESPIRATION RATE: 18 BRPM | OXYGEN SATURATION: 98 % | TEMPERATURE: 99 F | HEART RATE: 71 BPM | DIASTOLIC BLOOD PRESSURE: 68 MMHG | SYSTOLIC BLOOD PRESSURE: 144 MMHG

## 2019-10-08 PROCEDURE — A6216 NON-STERILE GAUZE<=16 SQ IN: HCPCS

## 2019-10-08 PROCEDURE — 3331090002 HH PPS REVENUE DEBIT

## 2019-10-08 PROCEDURE — 3331090001 HH PPS REVENUE CREDIT

## 2019-10-08 PROCEDURE — A6260 WOUND CLEANSER ANY TYPE/SIZE: HCPCS

## 2019-10-08 PROCEDURE — A6212 FOAM DRG <=16 SQ IN W/BORDER: HCPCS

## 2019-10-08 PROCEDURE — G0299 HHS/HOSPICE OF RN EA 15 MIN: HCPCS

## 2019-10-08 PROCEDURE — 400013 HH SOC

## 2019-10-09 PROCEDURE — 3331090002 HH PPS REVENUE DEBIT

## 2019-10-09 PROCEDURE — 3331090001 HH PPS REVENUE CREDIT

## 2019-10-10 PROCEDURE — 3331090002 HH PPS REVENUE DEBIT

## 2019-10-10 PROCEDURE — 3331090001 HH PPS REVENUE CREDIT

## 2019-10-11 ENCOUNTER — HOME CARE VISIT (OUTPATIENT)
Dept: SCHEDULING | Facility: HOME HEALTH | Age: 84
End: 2019-10-11
Payer: MEDICARE

## 2019-10-11 ENCOUNTER — HOME CARE VISIT (OUTPATIENT)
Dept: HOME HEALTH SERVICES | Facility: HOME HEALTH | Age: 84
End: 2019-10-11
Payer: MEDICARE

## 2019-10-11 VITALS
TEMPERATURE: 98 F | DIASTOLIC BLOOD PRESSURE: 60 MMHG | SYSTOLIC BLOOD PRESSURE: 120 MMHG | HEART RATE: 66 BPM | RESPIRATION RATE: 16 BRPM

## 2019-10-11 VITALS
HEART RATE: 74 BPM | RESPIRATION RATE: 18 BRPM | DIASTOLIC BLOOD PRESSURE: 60 MMHG | TEMPERATURE: 97.8 F | SYSTOLIC BLOOD PRESSURE: 124 MMHG

## 2019-10-11 PROCEDURE — G0156 HHCP-SVS OF AIDE,EA 15 MIN: HCPCS

## 2019-10-11 PROCEDURE — G0151 HHCP-SERV OF PT,EA 15 MIN: HCPCS

## 2019-10-11 PROCEDURE — 3331090001 HH PPS REVENUE CREDIT

## 2019-10-11 PROCEDURE — 3331090002 HH PPS REVENUE DEBIT

## 2019-10-12 PROCEDURE — 3331090002 HH PPS REVENUE DEBIT

## 2019-10-12 PROCEDURE — 3331090001 HH PPS REVENUE CREDIT

## 2019-10-13 PROCEDURE — 3331090002 HH PPS REVENUE DEBIT

## 2019-10-13 PROCEDURE — 3331090001 HH PPS REVENUE CREDIT

## 2019-10-14 ENCOUNTER — HOME CARE VISIT (OUTPATIENT)
Dept: SCHEDULING | Facility: HOME HEALTH | Age: 84
End: 2019-10-14
Payer: MEDICARE

## 2019-10-14 VITALS
OXYGEN SATURATION: 96 % | DIASTOLIC BLOOD PRESSURE: 76 MMHG | RESPIRATION RATE: 16 BRPM | SYSTOLIC BLOOD PRESSURE: 124 MMHG | TEMPERATURE: 97.8 F | HEART RATE: 68 BPM

## 2019-10-14 PROCEDURE — G0299 HHS/HOSPICE OF RN EA 15 MIN: HCPCS

## 2019-10-14 PROCEDURE — 3331090001 HH PPS REVENUE CREDIT

## 2019-10-14 PROCEDURE — 3331090002 HH PPS REVENUE DEBIT

## 2019-10-15 ENCOUNTER — HOME CARE VISIT (OUTPATIENT)
Dept: SCHEDULING | Facility: HOME HEALTH | Age: 84
End: 2019-10-15
Payer: MEDICARE

## 2019-10-15 VITALS
RESPIRATION RATE: 16 BRPM | DIASTOLIC BLOOD PRESSURE: 62 MMHG | HEART RATE: 70 BPM | SYSTOLIC BLOOD PRESSURE: 140 MMHG | TEMPERATURE: 98.2 F

## 2019-10-15 VITALS
DIASTOLIC BLOOD PRESSURE: 62 MMHG | HEART RATE: 70 BPM | TEMPERATURE: 98.2 F | RESPIRATION RATE: 16 BRPM | SYSTOLIC BLOOD PRESSURE: 140 MMHG

## 2019-10-15 PROCEDURE — G0157 HHC PT ASSISTANT EA 15: HCPCS

## 2019-10-15 PROCEDURE — G0152 HHCP-SERV OF OT,EA 15 MIN: HCPCS

## 2019-10-15 PROCEDURE — 3331090002 HH PPS REVENUE DEBIT

## 2019-10-15 PROCEDURE — 3331090001 HH PPS REVENUE CREDIT

## 2019-10-16 PROCEDURE — 3331090002 HH PPS REVENUE DEBIT

## 2019-10-16 PROCEDURE — 3331090001 HH PPS REVENUE CREDIT

## 2019-10-17 ENCOUNTER — HOME CARE VISIT (OUTPATIENT)
Dept: SCHEDULING | Facility: HOME HEALTH | Age: 84
End: 2019-10-17
Payer: MEDICARE

## 2019-10-17 VITALS
SYSTOLIC BLOOD PRESSURE: 136 MMHG | TEMPERATURE: 98.6 F | DIASTOLIC BLOOD PRESSURE: 68 MMHG | RESPIRATION RATE: 18 BRPM | HEART RATE: 60 BPM

## 2019-10-17 PROCEDURE — G0157 HHC PT ASSISTANT EA 15: HCPCS

## 2019-10-17 PROCEDURE — 3331090002 HH PPS REVENUE DEBIT

## 2019-10-17 PROCEDURE — 3331090001 HH PPS REVENUE CREDIT

## 2019-10-18 ENCOUNTER — HOME CARE VISIT (OUTPATIENT)
Dept: HOME HEALTH SERVICES | Facility: HOME HEALTH | Age: 84
End: 2019-10-18
Payer: MEDICARE

## 2019-10-18 ENCOUNTER — HOME CARE VISIT (OUTPATIENT)
Dept: SCHEDULING | Facility: HOME HEALTH | Age: 84
End: 2019-10-18
Payer: MEDICARE

## 2019-10-18 VITALS
HEART RATE: 70 BPM | TEMPERATURE: 97.6 F | RESPIRATION RATE: 18 BRPM | DIASTOLIC BLOOD PRESSURE: 58 MMHG | SYSTOLIC BLOOD PRESSURE: 122 MMHG

## 2019-10-18 PROCEDURE — 3331090002 HH PPS REVENUE DEBIT

## 2019-10-18 PROCEDURE — G0158 HHC OT ASSISTANT EA 15: HCPCS

## 2019-10-18 PROCEDURE — G0156 HHCP-SVS OF AIDE,EA 15 MIN: HCPCS

## 2019-10-18 PROCEDURE — 3331090001 HH PPS REVENUE CREDIT

## 2019-10-19 PROCEDURE — 3331090001 HH PPS REVENUE CREDIT

## 2019-10-19 PROCEDURE — 3331090002 HH PPS REVENUE DEBIT

## 2019-10-20 PROCEDURE — 3331090002 HH PPS REVENUE DEBIT

## 2019-10-20 PROCEDURE — 3331090001 HH PPS REVENUE CREDIT

## 2019-10-21 ENCOUNTER — HOME CARE VISIT (OUTPATIENT)
Dept: SCHEDULING | Facility: HOME HEALTH | Age: 84
End: 2019-10-21
Payer: MEDICARE

## 2019-10-21 VITALS
RESPIRATION RATE: 18 BRPM | DIASTOLIC BLOOD PRESSURE: 70 MMHG | TEMPERATURE: 98 F | HEART RATE: 70 BPM | SYSTOLIC BLOOD PRESSURE: 158 MMHG

## 2019-10-21 PROCEDURE — 3331090001 HH PPS REVENUE CREDIT

## 2019-10-21 PROCEDURE — G0158 HHC OT ASSISTANT EA 15: HCPCS

## 2019-10-21 PROCEDURE — 3331090002 HH PPS REVENUE DEBIT

## 2019-10-22 ENCOUNTER — HOME CARE VISIT (OUTPATIENT)
Dept: SCHEDULING | Facility: HOME HEALTH | Age: 84
End: 2019-10-22
Payer: MEDICARE

## 2019-10-22 VITALS
DIASTOLIC BLOOD PRESSURE: 62 MMHG | HEART RATE: 65 BPM | SYSTOLIC BLOOD PRESSURE: 150 MMHG | TEMPERATURE: 97 F | RESPIRATION RATE: 18 BRPM

## 2019-10-22 VITALS
SYSTOLIC BLOOD PRESSURE: 140 MMHG | HEART RATE: 60 BPM | DIASTOLIC BLOOD PRESSURE: 78 MMHG | TEMPERATURE: 98.3 F | RESPIRATION RATE: 18 BRPM

## 2019-10-22 PROCEDURE — 3331090001 HH PPS REVENUE CREDIT

## 2019-10-22 PROCEDURE — G0157 HHC PT ASSISTANT EA 15: HCPCS

## 2019-10-22 PROCEDURE — G0158 HHC OT ASSISTANT EA 15: HCPCS

## 2019-10-22 PROCEDURE — 3331090002 HH PPS REVENUE DEBIT

## 2019-10-23 PROCEDURE — 3331090002 HH PPS REVENUE DEBIT

## 2019-10-23 PROCEDURE — 3331090001 HH PPS REVENUE CREDIT

## 2019-10-24 ENCOUNTER — HOME CARE VISIT (OUTPATIENT)
Dept: SCHEDULING | Facility: HOME HEALTH | Age: 84
End: 2019-10-24
Payer: MEDICARE

## 2019-10-24 PROCEDURE — 3331090002 HH PPS REVENUE DEBIT

## 2019-10-24 PROCEDURE — 3331090001 HH PPS REVENUE CREDIT

## 2019-10-25 ENCOUNTER — HOME CARE VISIT (OUTPATIENT)
Dept: SCHEDULING | Facility: HOME HEALTH | Age: 84
End: 2019-10-25
Payer: MEDICARE

## 2019-10-25 VITALS
RESPIRATION RATE: 16 BRPM | HEART RATE: 80 BPM | DIASTOLIC BLOOD PRESSURE: 62 MMHG | SYSTOLIC BLOOD PRESSURE: 132 MMHG | TEMPERATURE: 97.3 F

## 2019-10-25 PROCEDURE — G0157 HHC PT ASSISTANT EA 15: HCPCS

## 2019-10-25 PROCEDURE — G0156 HHCP-SVS OF AIDE,EA 15 MIN: HCPCS

## 2019-10-25 PROCEDURE — 3331090001 HH PPS REVENUE CREDIT

## 2019-10-25 PROCEDURE — 3331090002 HH PPS REVENUE DEBIT

## 2019-10-26 PROCEDURE — 3331090002 HH PPS REVENUE DEBIT

## 2019-10-26 PROCEDURE — 3331090001 HH PPS REVENUE CREDIT

## 2019-10-27 PROCEDURE — 3331090002 HH PPS REVENUE DEBIT

## 2019-10-27 PROCEDURE — 3331090001 HH PPS REVENUE CREDIT

## 2019-10-28 VITALS
HEART RATE: 77 BPM | SYSTOLIC BLOOD PRESSURE: 131 MMHG | RESPIRATION RATE: 18 BRPM | DIASTOLIC BLOOD PRESSURE: 67 MMHG | TEMPERATURE: 97.5 F

## 2019-10-28 PROCEDURE — 3331090002 HH PPS REVENUE DEBIT

## 2019-10-28 PROCEDURE — 3331090001 HH PPS REVENUE CREDIT

## 2019-10-29 ENCOUNTER — HOME CARE VISIT (OUTPATIENT)
Dept: SCHEDULING | Facility: HOME HEALTH | Age: 84
End: 2019-10-29
Payer: MEDICARE

## 2019-10-29 VITALS
SYSTOLIC BLOOD PRESSURE: 132 MMHG | DIASTOLIC BLOOD PRESSURE: 60 MMHG | HEART RATE: 63 BPM | TEMPERATURE: 97.3 F | RESPIRATION RATE: 18 BRPM

## 2019-10-29 VITALS
SYSTOLIC BLOOD PRESSURE: 121 MMHG | RESPIRATION RATE: 17 BRPM | HEART RATE: 70 BPM | DIASTOLIC BLOOD PRESSURE: 61 MMHG | TEMPERATURE: 98.8 F

## 2019-10-29 PROCEDURE — 3331090001 HH PPS REVENUE CREDIT

## 2019-10-29 PROCEDURE — G0156 HHCP-SVS OF AIDE,EA 15 MIN: HCPCS

## 2019-10-29 PROCEDURE — 3331090002 HH PPS REVENUE DEBIT

## 2019-10-29 PROCEDURE — G0158 HHC OT ASSISTANT EA 15: HCPCS

## 2019-10-30 ENCOUNTER — HOME CARE VISIT (OUTPATIENT)
Dept: SCHEDULING | Facility: HOME HEALTH | Age: 84
End: 2019-10-30
Payer: MEDICARE

## 2019-10-30 VITALS
HEART RATE: 73 BPM | DIASTOLIC BLOOD PRESSURE: 70 MMHG | SYSTOLIC BLOOD PRESSURE: 130 MMHG | TEMPERATURE: 98.3 F | RESPIRATION RATE: 16 BRPM

## 2019-10-30 VITALS
SYSTOLIC BLOOD PRESSURE: 128 MMHG | RESPIRATION RATE: 18 BRPM | HEART RATE: 68 BPM | TEMPERATURE: 97.6 F | DIASTOLIC BLOOD PRESSURE: 66 MMHG

## 2019-10-30 PROCEDURE — G0158 HHC OT ASSISTANT EA 15: HCPCS

## 2019-10-30 PROCEDURE — G0157 HHC PT ASSISTANT EA 15: HCPCS

## 2019-10-30 PROCEDURE — 3331090001 HH PPS REVENUE CREDIT

## 2019-10-30 PROCEDURE — 3331090002 HH PPS REVENUE DEBIT

## 2019-10-31 PROCEDURE — 3331090001 HH PPS REVENUE CREDIT

## 2019-10-31 PROCEDURE — 3331090002 HH PPS REVENUE DEBIT

## 2019-11-01 ENCOUNTER — HOME CARE VISIT (OUTPATIENT)
Dept: SCHEDULING | Facility: HOME HEALTH | Age: 84
End: 2019-11-01
Payer: MEDICARE

## 2019-11-01 VITALS
RESPIRATION RATE: 16 BRPM | DIASTOLIC BLOOD PRESSURE: 70 MMHG | HEART RATE: 72 BPM | TEMPERATURE: 98 F | SYSTOLIC BLOOD PRESSURE: 142 MMHG

## 2019-11-01 PROCEDURE — 3331090001 HH PPS REVENUE CREDIT

## 2019-11-01 PROCEDURE — 3331090002 HH PPS REVENUE DEBIT

## 2019-11-01 PROCEDURE — G0157 HHC PT ASSISTANT EA 15: HCPCS

## 2019-11-02 PROCEDURE — 3331090001 HH PPS REVENUE CREDIT

## 2019-11-02 PROCEDURE — 3331090002 HH PPS REVENUE DEBIT

## 2019-11-03 PROCEDURE — 3331090002 HH PPS REVENUE DEBIT

## 2019-11-03 PROCEDURE — 3331090001 HH PPS REVENUE CREDIT

## 2019-11-04 ENCOUNTER — HOME CARE VISIT (OUTPATIENT)
Dept: SCHEDULING | Facility: HOME HEALTH | Age: 84
End: 2019-11-04
Payer: MEDICARE

## 2019-11-04 VITALS
DIASTOLIC BLOOD PRESSURE: 72 MMHG | TEMPERATURE: 99.2 F | SYSTOLIC BLOOD PRESSURE: 144 MMHG | RESPIRATION RATE: 16 BRPM | HEART RATE: 68 BPM

## 2019-11-04 VITALS
TEMPERATURE: 98.2 F | OXYGEN SATURATION: 96 % | RESPIRATION RATE: 16 BRPM | HEART RATE: 66 BPM | SYSTOLIC BLOOD PRESSURE: 128 MMHG | DIASTOLIC BLOOD PRESSURE: 68 MMHG

## 2019-11-04 PROCEDURE — 3331090001 HH PPS REVENUE CREDIT

## 2019-11-04 PROCEDURE — G0152 HHCP-SERV OF OT,EA 15 MIN: HCPCS

## 2019-11-04 PROCEDURE — 3331090002 HH PPS REVENUE DEBIT

## 2019-11-04 PROCEDURE — G0151 HHCP-SERV OF PT,EA 15 MIN: HCPCS

## 2019-11-05 PROCEDURE — 3331090001 HH PPS REVENUE CREDIT

## 2019-11-05 PROCEDURE — 3331090002 HH PPS REVENUE DEBIT

## 2019-11-06 ENCOUNTER — HOME CARE VISIT (OUTPATIENT)
Dept: HOME HEALTH SERVICES | Facility: HOME HEALTH | Age: 84
End: 2019-11-06
Payer: MEDICARE

## 2019-11-06 PROCEDURE — G0156 HHCP-SVS OF AIDE,EA 15 MIN: HCPCS

## 2019-11-06 PROCEDURE — 3331090001 HH PPS REVENUE CREDIT

## 2019-11-06 PROCEDURE — 3331090002 HH PPS REVENUE DEBIT

## 2019-11-07 PROCEDURE — 3331090001 HH PPS REVENUE CREDIT

## 2019-11-07 PROCEDURE — 3331090002 HH PPS REVENUE DEBIT

## 2019-11-08 VITALS
TEMPERATURE: 97.8 F | SYSTOLIC BLOOD PRESSURE: 121 MMHG | DIASTOLIC BLOOD PRESSURE: 61 MMHG | RESPIRATION RATE: 17 BRPM | HEART RATE: 64 BPM

## 2019-11-08 PROCEDURE — 3331090001 HH PPS REVENUE CREDIT

## 2019-11-08 PROCEDURE — 3331090002 HH PPS REVENUE DEBIT

## 2019-11-09 PROCEDURE — 3331090002 HH PPS REVENUE DEBIT

## 2019-11-09 PROCEDURE — 3331090001 HH PPS REVENUE CREDIT

## 2019-11-10 PROCEDURE — 3331090001 HH PPS REVENUE CREDIT

## 2019-11-10 PROCEDURE — 3331090002 HH PPS REVENUE DEBIT

## 2019-11-11 PROCEDURE — 3331090001 HH PPS REVENUE CREDIT

## 2019-11-11 PROCEDURE — 3331090002 HH PPS REVENUE DEBIT

## 2019-11-12 ENCOUNTER — HOME CARE VISIT (OUTPATIENT)
Dept: SCHEDULING | Facility: HOME HEALTH | Age: 84
End: 2019-11-12
Payer: MEDICARE

## 2019-11-12 VITALS
DIASTOLIC BLOOD PRESSURE: 60 MMHG | OXYGEN SATURATION: 96 % | RESPIRATION RATE: 18 BRPM | HEART RATE: 80 BPM | SYSTOLIC BLOOD PRESSURE: 118 MMHG | TEMPERATURE: 97.9 F

## 2019-11-12 PROCEDURE — 3331090002 HH PPS REVENUE DEBIT

## 2019-11-12 PROCEDURE — G0299 HHS/HOSPICE OF RN EA 15 MIN: HCPCS

## 2019-11-12 PROCEDURE — 3331090001 HH PPS REVENUE CREDIT

## 2020-01-03 ENCOUNTER — PATIENT OUTREACH (OUTPATIENT)
Dept: CASE MANAGEMENT | Age: 85
End: 2020-01-03

## 2020-01-03 NOTE — PROGRESS NOTES
This note will not be viewable in [address] Referral via [address] [place] [address] Place Assignment  Risk score 39%  Patient is known to this CM from 2017  Primary issue for patient is age-related debility and some HTN   Initial outreach today - no answer. Left message on identified voice mail requesting return call.

## 2020-01-06 ENCOUNTER — PATIENT OUTREACH (OUTPATIENT)
Dept: CASE MANAGEMENT | Age: 85
End: 2020-01-06

## 2020-01-06 NOTE — PROGRESS NOTES
Second outreach related to [address] Assignment  No answer - left second message requesting a return call. No Episode opened as yet. Reminder set for third outreach pending return call.

## 2020-01-09 ENCOUNTER — PATIENT OUTREACH (OUTPATIENT)
Dept: CASE MANAGEMENT | Age: 85
End: 2020-01-09

## 2020-01-09 NOTE — PROGRESS NOTES
Able to connect with patient's daughter regarding referral via Risk Stratification/Bulk Assignment. Discussed ongoing support to facilitate long term care arrangements as Patient declines. Daughter declines ongoing services at this time but took this CM's contact information. Discussed some options - family has some [facility] - lump sum payment - set aside help pay for care. Explained we can facilitate LTC Medicaid should patient quality. Daughter considering placing patient on Wait List at [facility]. Daughter continues to manage well enough, though recognizes that she is also aging and it is sometimes difficult to manage everything - she is doing all the cooking and cleaning, driving. Patient still able to feed herself, transfer to toilet (remains continent).     No further outreach planned at this time  Episode not opened  Removed self from Care Team

## 2020-07-01 ENCOUNTER — HOSPICE ADMISSION (OUTPATIENT)
Dept: HOSPICE | Facility: HOSPICE | Age: 85
End: 2020-07-01

## 2020-07-10 ENCOUNTER — HOME CARE VISIT (OUTPATIENT)
Dept: HOSPICE | Facility: HOSPICE | Age: 85
End: 2020-07-10

## 2020-07-13 ENCOUNTER — PATIENT OUTREACH (OUTPATIENT)
Dept: CASE MANAGEMENT | Age: 85
End: 2020-07-13

## 2020-07-13 NOTE — PROGRESS NOTES
Returned call to daughter for message left on Friday 7/10 when this CM was out on PTO. Daughter feels mother/patient is declining and wonders about options. [facility] was out to do evaluation but did not feel that patient met the 6 mos or less guidelines as outlined by Medicare. There is a chart note about a neurology consult - but daughter did not mention it. Discussed option of [address] to support medical care in the home. Also discussed some private pay help from an agency such as [address] [place] [address]. Subsequent call to Carrington Spears with [address] - my will call family to set up an initial evaluation.     Plan - Follow up in 10 days or so - My to notify if they  the patient

## 2020-07-27 ENCOUNTER — PATIENT OUTREACH (OUTPATIENT)
Dept: CASE MANAGEMENT | Age: 85
End: 2020-07-27

## 2020-07-27 NOTE — PROGRESS NOTES
Ongoing outreach for Transitions of care  Confirmed with patient's daughter and also  that Juarez has made an initial visit with another scheduled for tomorrow. Patient has been to see neurologist - daughter reports that patient has advanced parkinson's - no medications changes. Confirmed that daughter has my contact information should she need support in the future. At this point will close Episode and removed self from care team  Will reopen should daughter reach out.

## 2021-04-13 NOTE — H&P
Hospitalist Admission History and Physical     NAME:  Bob Ayoub   Age:  80 y.o.  :   1926   MRN:   [id number]  PCP: Linh Schmitz  Consulting MD:  Treatment Team: Attending Provider: J Luis Mcmahon DO; Primary Nurse: Eddie Villeda RN    Chief Complaint   Patient presents with    Fatigue         HPI:   Patient is a 80 y.o. female who presented to the ED for a cc leg weakness bilaterally along with slurred speech. Patient has a hx significant for hypothyroidism, HLD, chronic LE weakness of unknown origin for the past two years, and HTN. Patient noticed that she was having a hard time getting up from her chair around 4:30pm today. At that time she also noted to have trouble with speech and some mild confusion. No hx of stroke. Only takes levothyroxine and amlodipine. Vitals - temp 102. Labs - WBC 12.7. UA negative. Chest x ray negative. Flu negative. CT abdomen and CT head pending. Past Medical History:   Diagnosis Date    Acute lymphadenitis     Backache, unspecified     Elevated blood pressure reading without diagnosis of hypertension     Esophageal reflux     Hyperosmolality and/or hypernatremia 2015    Hypothyroidism 2015    Impaired fasting glucose 2015    Mixed hyperlipidemia 2015    Other constipation     Unspecified venous (peripheral) insufficiency     Viral warts, unspecified 2015        Past Surgical History:   Procedure Laterality Date    HX HERNIA REPAIR      HX HYSTERECTOMY      TOTAL        Family History   Problem Relation Age of Onset    Heart Disease Other         Arteriosclerotic Cardiovascular Disease    Heart Disease Sister     Diabetes Brother     Heart Disease Brother     Breast Cancer Cousin         2 1st cousins and her neice's daughter.      Ovarian Cancer Neg Hx     Colon Cancer Neg Hx     Prostate Cancer Neg Hx     Deep Vein Thrombosis Neg Hx     Pulmonary Embolism Neg Hx        Social History Social History Narrative    1. Daughter Soni Alvarado. Social History     Tobacco Use    Smoking status: Never Smoker    Smokeless tobacco: Never Used   Substance Use Topics    Alcohol use: No     Alcohol/week: 0.0 oz        Social History     Substance and Sexual Activity   Drug Use No         Allergies   Allergen Reactions    Fish Oil Nausea and Vomiting       Prior to Admission medications    Medication Sig Start Date End Date Taking? Authorizing Provider   levothyroxine (SYNTHROID) 50 mcg tablet Take 1 Tab by mouth Daily (before breakfast). 12/12/18   Casey Szymanski, DO   amLODIPine (NORVASC) 5 mg tablet Take 1 Tab by mouth daily. 4/12/18   Luis DÍAZ, DO   ascorbic acid (VITAMIN C) 500 mg tablet Take 500 mg by mouth two (2) times a day. Provider, Historical   calcium carbonate-simethicone 1,000-60 mg chew Take 1,200 mg by mouth daily. Provider, Historical   multivitamin (ONE A DAY) tablet Take 1 Tab by mouth daily. Provider, Historical   CHOLECALCIFEROL, VITAMIN D3, (VITAMIN D3 PO) Take  by mouth. Provider, Historical           Review of Systems    Constitutional:  NAD  Eyes:  no change in visual acuity, no photophobia  Ears, nose, mouth, throat, and face: no  Odynphagia, dysphagia, no thrush or exudate, negative for chronic sinus congestion, recurrent headaches  Respiratory: negative for SOB, hemoptysis or cough  Cardiovascular: negative for CP, palpitations, or PND  Gastrointestinal: negative for abdominal pain, no hematemesis, hematochezia or BRBPR  Genitourinary: no urgency, frequency, or dysuria, no nocturia  Integument/breast: negative for skin rash or skin lesions  Hematologic/lymphatic: negative for known bleeding disorder  Musculoskeletal:bilateral leg weakness. Chronic edema to right LE compared to left   Neurological: slurred speech with confusion. Chronic right eyelid lag.    Behavioral/Psych: negative for depression or chronic anxiety,   Endocrine: negative for polydyspia, polyuria or intolerance to heat or cold  Allergic/Immunologic: negative for chronic allergic rhinitis, or known connective tissue disorder      Objective:     Visit Vitals  /56   Pulse 80   Temp (!) 102 °F (38.9 °C)   Resp 25   Ht 5' 3\" (1.6 m)   Wt 48.5 kg (107 lb)   SpO2 97%   BMI 18.95 kg/m²        12/24 1901 - 12/25 0700  In: 1050 [I.V.:1050]  Out: -   No intake/output data recorded. Data Review:   Recent Results (from the past 24 hour(s))   CBC WITH AUTOMATED DIFF    Collection Time: 12/24/18  9:18 PM   Result Value Ref Range    WBC 12.7 (H) 4.3 - 11.1 K/uL    RBC 4.07 4.05 - 5.2 M/uL    HGB 12.4 11.7 - 15.4 g/dL    HCT 39.0 35.8 - 46.3 %    MCV 95.8 79.6 - 97.8 FL    MCH 30.5 26.1 - 32.9 PG    MCHC 31.8 31.4 - 35.0 g/dL    RDW 12.2 11.9 - 14.6 %    PLATELET 433 408 - 209 K/uL    MPV 10.4 9.4 - 12.3 FL    ABSOLUTE NRBC 0.00 0.0 - 0.2 K/uL    DF AUTOMATED      NEUTROPHILS 89 (H) 43 - 78 %    LYMPHOCYTES 4 (L) 13 - 44 %    MONOCYTES 6 4.0 - 12.0 %    EOSINOPHILS 0 (L) 0.5 - 7.8 %    BASOPHILS 0 0.0 - 2.0 %    IMMATURE GRANULOCYTES 1 0.0 - 5.0 %    ABS. NEUTROPHILS 11.3 (H) 1.7 - 8.2 K/UL    ABS. LYMPHOCYTES 0.5 0.5 - 4.6 K/UL    ABS. MONOCYTES 0.8 0.1 - 1.3 K/UL    ABS. EOSINOPHILS 0.0 0.0 - 0.8 K/UL    ABS. BASOPHILS 0.1 0.0 - 0.2 K/UL    ABS. IMM. GRANS. 0.1 0.0 - 0.5 K/UL   METABOLIC PANEL, COMPREHENSIVE    Collection Time: 12/24/18  9:18 PM   Result Value Ref Range    Sodium 136 136 - 145 mmol/L    Potassium 4.1 3.5 - 5.1 mmol/L    Chloride 103 98 - 107 mmol/L    CO2 25 21 - 32 mmol/L    Anion gap 8 7 - 16 mmol/L    Glucose 128 (H) 65 - 100 mg/dL    BUN 10 8 - 23 MG/DL    Creatinine 0.69 0.6 - 1.0 MG/DL    GFR est AA >60 >60 ml/min/1.73m2    GFR est non-AA >60 >60 ml/min/1.73m2    Calcium 8.6 8.3 - 10.4 MG/DL    Bilirubin, total 0.7 0.2 - 1.1 MG/DL    ALT (SGPT) 21 12 - 65 U/L    AST (SGOT) 41 (H) 15 - 37 U/L    Alk.  phosphatase 64 50 - 136 U/L    Protein, total 7.6 6.3 - 8.2 g/dL Albumin 3.7 3.2 - 4.6 g/dL    Globulin 3.9 (H) 2.3 - 3.5 g/dL    A-G Ratio 0.9 (L) 1.2 - 3.5     POC LACTIC ACID    Collection Time: 12/24/18  9:18 PM   Result Value Ref Range    Lactic Acid (POC) 0.89 0.5 - 1.9 mmol/L   MAGNESIUM    Collection Time: 12/24/18  9:18 PM   Result Value Ref Range    Magnesium 1.9 1.8 - 2.4 mg/dL   EKG, 12 LEAD, INITIAL    Collection Time: 12/24/18  9:38 PM   Result Value Ref Range    Ventricular Rate 90 BPM    Atrial Rate 90 BPM    P-R Interval 196 ms    QRS Duration 104 ms    Q-T Interval 368 ms    QTC Calculation (Bezet) 450 ms    Calculated P Axis 73 degrees    Calculated R Axis 55 degrees    Calculated T Axis 44 degrees    Diagnosis       !! AGE AND GENDER SPECIFIC ECG ANALYSIS !! Sinus rhythm with frequent Premature ventricular complexes  Incomplete right bundle branch block  Borderline ECG  No previous ECGs available     URINE MICROSCOPIC    Collection Time: 12/24/18  9:39 PM   Result Value Ref Range    WBC 0 0 /hpf    RBC 0-3 0 /hpf    Epithelial cells 0 0 /hpf    Bacteria 0 0 /hpf    Casts 0 0 /lpf   INFLUENZA A & B AG (RAPID TEST)    Collection Time: 12/24/18 10:02 PM   Result Value Ref Range    Influenza A Ag NEGATIVE  NEG      Influenza B Ag NEGATIVE  NEG      Source NASOPHARYNGEAL         Physical Exam:     General:  Alert, cooperative, no distress, appears stated age. Eyes:  Conjunctivae/corneas clear. PERRL, EOMs intact. Fundi benign. Right eyelid drooping compared to left    Ears:  Normal TMs and external ear canals both ears. Nose: Nares normal. Septum midline. Mouth/Throat: Mild left facial droop. No tongue deviation. Slurred speech occassionally. Searching for words at times. Neck:  no JVD. Back:   deferred   Lungs:   Clear to auscultation bilaterally. Heart:  Blowing systolic murmur best heard at 2nd left intercostal space    Abdomen:   Soft, non-tender. Bowel sounds normal. No masses,  No organomegaly.    Extremities: 1+ edema to right LE compared to the left. Non tender to palpation. Good popliteal pulses. 4/5 strength to right UE. 5/5 strength to left UE and LE bilaterally    Pulses: 2+ and symmetric all extremities. Skin: Skin color, texture, turgor normal. No rashes or lesions   Lymph nodes: Cervical, supraclavicular, and axillary nodes normal.   Neurologic: As above      Assessment and Plan     Principal Problem:    Sepsis ([address] (12/24/2018)    Active Problems:    Mixed hyperlipidemia (2/6/2015)      Hypothyroidism (2/6/2015)      Slurred speech (12/24/2018)      HTN (hypertension) (12/24/2018)    Sepsis of unknown source. Chest x ray and UA negative. CT abdomen pending. Will place on sepsis protocol. Blood cultures pending. CT abdomen pending. Will place on Vancomycin and Zosyn. Slurred speech - Could be due to sepsis but due to age and HTN could have had a stroke versus TIA. Order CT head. Stroke work up including MRI brain without contrast, ECHO, ASA, statin. US carotids. Leg weakness - Could be from sepsis of unknown origin versus stroke. Work up as above. Right LE bigger than left that patient states is chronic. Will order US right LE since not on anticoagulant and poor mobility at home.      DVT prophylaxis - SCDs due to fall risk     Signed By: Jayesh Anthony,    December 24, 2018 supervision

## 2022-03-19 PROBLEM — I73.9 PERIPHERAL VASCULAR DISEASE (HCC): Status: ACTIVE | Noted: 2019-09-17

## 2022-03-19 PROBLEM — I63.9 ACUTE ISCHEMIC STROKE (HCC): Status: ACTIVE | Noted: 2018-12-24

## 2022-03-19 PROBLEM — I10 HTN (HYPERTENSION): Status: ACTIVE | Noted: 2018-12-24

## 2022-03-19 PROBLEM — R65.10 SIRS (SYSTEMIC INFLAMMATORY RESPONSE SYNDROME) (HCC): Status: ACTIVE | Noted: 2018-12-28

## 2024-01-03 NOTE — ED TRIAGE NOTES
Pt arrived from home by self via [facility] with c/o leg weakness. Pt called out to [facility] earlier today for assistance to get out of her chair, refused transport. Called again and EMS asked ehr if she would go to hospital. Pt agreed. Pt states, \"My legs wouldn't work tonight. \"
No